# Patient Record
Sex: FEMALE | Race: WHITE | Employment: OTHER | URBAN - METROPOLITAN AREA
[De-identification: names, ages, dates, MRNs, and addresses within clinical notes are randomized per-mention and may not be internally consistent; named-entity substitution may affect disease eponyms.]

---

## 2018-08-06 ENCOUNTER — TRANSFERRED RECORDS (OUTPATIENT)
Dept: MULTI SPECIALTY CLINIC | Facility: CLINIC | Age: 43
End: 2018-08-06

## 2018-08-06 LAB — PAP SMEAR - HIM PATIENT REPORTED: NORMAL

## 2019-07-31 ENCOUNTER — OFFICE VISIT (OUTPATIENT)
Dept: INTERNAL MEDICINE | Facility: CLINIC | Age: 44
End: 2019-07-31
Payer: COMMERCIAL

## 2019-07-31 VITALS
SYSTOLIC BLOOD PRESSURE: 124 MMHG | OXYGEN SATURATION: 99 % | WEIGHT: 111.5 LBS | HEART RATE: 104 BPM | BODY MASS INDEX: 19.14 KG/M2 | DIASTOLIC BLOOD PRESSURE: 86 MMHG | RESPIRATION RATE: 18 BRPM

## 2019-07-31 DIAGNOSIS — Z12.31 ENCOUNTER FOR SCREENING MAMMOGRAM FOR BREAST CANCER: ICD-10-CM

## 2019-07-31 DIAGNOSIS — D21.22 FIBROMA OF LEFT FOOT: Primary | ICD-10-CM

## 2019-07-31 PROCEDURE — 99202 OFFICE O/P NEW SF 15 MIN: CPT | Performed by: INTERNAL MEDICINE

## 2019-07-31 NOTE — PROGRESS NOTES
SUBJECTIVE:                                                      HPI: Soraya Vann is a pleasant 44 year old female who presents with a bump on her foot:    Accompanied by SO.     First noticed bump on bottom of left foot several days ago. Mid foot. Painful to the touch.     No precipitating trauma or injury.   No fevers or chills.  No redness or skin changes.   No prior occurences or family history of similar bumps.     Patient has a very active job ([employer]) and is usually either barefoot or wearing flip flips when working.     Patient is also DUE for a screening mammogram.     The medication, allergy, and problem lists have been reviewed and updated as appropriate.     OBJECTIVE:                                                      /86   Pulse 104   Resp 18   Wt 50.6 kg (111 lb 8 oz)   LMP 07/30/2019 (Exact Date)   SpO2 99%   BMI 19.14 kg/m    Constitutional: well-appearing  Left foot, plantar aspect: subcutaneous nodule mid left foot, tender to palpation, no overlying skin changes    ASSESSMENT/PLAN:                                                      (D21.22) Fibroma of left foot  (primary encounter diagnosis)  Comment: plantar fibroma; patient reassured of benign nature.   Plan:    - cool compresses and NSAIDs for comfort.   - patient encouraged to wear supportive shoes when active.     (Z12.31) Encounter for screening mammogram for breast cancer  Plan: mammogram ordered - patient to schedule.     The instructions on the AVS were discussed and explained to the patient. Patient expressed understanding of instructions.    (Chart documentation was completed, in part, with [employer] voice-recognition software. Even though reviewed, some grammatical, spelling, and word errors may remain.)    Fela Welch MD   [address] [address]  T: [phone], F: [phone]

## 2019-07-31 NOTE — PATIENT INSTRUCTIONS
Fibroma - benign!    Inflamed now - causing pain.    Recommend ibuprofen and cool compresses as needed for pain relief and swelling.    ---    Wear supportive shoes as much as possible when active.

## 2019-08-06 ENCOUNTER — ANCILLARY PROCEDURE (OUTPATIENT)
Dept: MAMMOGRAPHY | Facility: CLINIC | Age: 44
End: 2019-08-06
Attending: INTERNAL MEDICINE
Payer: COMMERCIAL

## 2019-08-06 DIAGNOSIS — Z12.31 VISIT FOR SCREENING MAMMOGRAM: ICD-10-CM

## 2019-08-06 DIAGNOSIS — Z12.31 ENCOUNTER FOR SCREENING MAMMOGRAM FOR BREAST CANCER: ICD-10-CM

## 2019-08-06 PROCEDURE — 77063 BREAST TOMOSYNTHESIS BI: CPT | Mod: TC

## 2019-08-06 PROCEDURE — 77067 SCR MAMMO BI INCL CAD: CPT | Mod: TC

## 2019-12-06 ENCOUNTER — OFFICE VISIT (OUTPATIENT)
Dept: OBGYN | Facility: CLINIC | Age: 44
End: 2019-12-06
Payer: COMMERCIAL

## 2019-12-06 VITALS
WEIGHT: 118 LBS | SYSTOLIC BLOOD PRESSURE: 108 MMHG | HEART RATE: 76 BPM | HEIGHT: 64 IN | DIASTOLIC BLOOD PRESSURE: 76 MMHG | BODY MASS INDEX: 20.14 KG/M2

## 2019-12-06 DIAGNOSIS — Z80.3 FH: BREAST CANCER: ICD-10-CM

## 2019-12-06 DIAGNOSIS — R19.7 DIARRHEA, UNSPECIFIED TYPE: ICD-10-CM

## 2019-12-06 DIAGNOSIS — Z01.419 ENCOUNTER FOR GYNECOLOGICAL EXAMINATION WITHOUT ABNORMAL FINDING: Primary | ICD-10-CM

## 2019-12-06 PROCEDURE — 99386 PREV VISIT NEW AGE 40-64: CPT | Performed by: OBSTETRICS & GYNECOLOGY

## 2019-12-06 RX ORDER — SERTRALINE HYDROCHLORIDE 25 MG/1
TABLET, FILM COATED ORAL
COMMUNITY
Start: 2019-12-04 | End: 2023-03-20

## 2019-12-06 ASSESSMENT — ANXIETY QUESTIONNAIRES
2. NOT BEING ABLE TO STOP OR CONTROL WORRYING: NOT AT ALL
3. WORRYING TOO MUCH ABOUT DIFFERENT THINGS: NOT AT ALL
1. FEELING NERVOUS, ANXIOUS, OR ON EDGE: NOT AT ALL
6. BECOMING EASILY ANNOYED OR IRRITABLE: NOT AT ALL
7. FEELING AFRAID AS IF SOMETHING AWFUL MIGHT HAPPEN: NOT AT ALL
GAD7 TOTAL SCORE: 0
5. BEING SO RESTLESS THAT IT IS HARD TO SIT STILL: NOT AT ALL

## 2019-12-06 ASSESSMENT — PATIENT HEALTH QUESTIONNAIRE - PHQ9
SUM OF ALL RESPONSES TO PHQ QUESTIONS 1-9: 1
5. POOR APPETITE OR OVEREATING: NOT AT ALL

## 2019-12-06 ASSESSMENT — MIFFLIN-ST. JEOR: SCORE: 1175.24

## 2019-12-06 NOTE — NURSING NOTE
Please abstract the following data from this visit with this patient into the appropriate field in Epic:        Other Tests found in the patient's chart through Chart Review/Care Everywhere:    Pap smear done by this group [facility] this date: 8/6/18    Note to Abstraction: If this section is blank, no results were found via Chart Review/Care Everywhere.

## 2019-12-06 NOTE — Clinical Note
Please abstract the following data from this visit with this patient into the appropriate field in Epic:Other Tests found in the patient's chart through Chart Review/Care Everywhere:Pap smear done by this group [facility] this date: 8/6/18Note to Abstraction: If this section is blank, no results were found via Chart Review/Care Everywhere.

## 2019-12-06 NOTE — PROGRESS NOTES
Soraya is a 44 year old No obstetric history on file. female who presents for annual exam.     Besides routine health maintenance,  she would like to discuss bloating, gas, diarrhea.    HPI:  The patient does not have a PCP.    Moved [place] here from [place].  works for [employer].   Pt is .  Noting intermittent bloating and diarrhea. Hasn't noted association with foods.  No bleeding with BM.  Cycles regular. NO association of diarrhea with cycles.   Mild deep dyspareunia recently.  Strong FH of breast cancer in Mom and 3 Maternal Aunts. Brother with prostate cancer age 40. As far as pt knows, no one has had genetic testing. Brother declined it.        GYNECOLOGIC HISTORY:    Patient's last menstrual period was 2019.    Regular menses? yes  Menses every 30 days.  Length of menses: 7 days    Her current contraception method is: vasectomy.  She  reports that she has been smoking. She has been smoking about 1.00 pack per day. She has never used smokeless tobacco.  Tobacco Cessation Action Plan: Information offered: Patient not interested at this time  Self help information given to patient  Patient is sexually active.  STD testing offered?  Declined  Last PHQ-9 score on record =   PHQ-9 SCORE 2019   PHQ-9 Total Score 1     Last GAD7 score on record =   ROHAN-7 SCORE 2019   Total Score 0     Alcohol Score = 5    HEALTH MAINTENANCE:  Cholesterol: 1.5 years ago  Last Mammo: 19 Result: Normal, Next Mammo:   Pap: 18/16 NIL HPV-  Colonoscopy:  never, Result: Not applicable, Next Colonoscopy: age 51.  Dexa:  Long ago    Health maintenance updated:  yes    HISTORY:  OB History    Para Term  AB Living   1 1 1 0 0 1   SAB TAB Ectopic Multiple Live Births   0 0 0 0 1      # Outcome Date GA Lbr [place]/2nd Weight Sex Delivery Anes PTL Lv   1 Term                There is no problem list on file for this patient.    Past Surgical History:   Procedure Laterality Date      "APPENDECTOMY       COMBINED CYSTOSCOPY, INSERT CATHETER URETER  12/24/2013    Procedure: COMBINED CYSTOSCOPY, INSERT CATHETER URETER;;  Surgeon: Jaycob Rdz MD;  Location:  OR     EXTRACORPOREAL SHOCK WAVE LITHOTRIPSY (ESWL)  12/24/2013    Procedure: EXTRACORPOREAL SHOCK WAVE LITHOTRIPSY (ESWL);  CYSTOSCOPY, LEFT URETERAL catheter, LEFT EXTRACORPOREAL SHOCK WAVE LITHOTRIPSY ;  Surgeon: Jaycob Rdz MD;  Location:  OR     ORTHOPEDIC SURGERY      left wrist      Social History     Tobacco Use     Smoking status: Current Every Day Smoker     Packs/day: 1.00     Smokeless tobacco: Never Used   Substance Use Topics     Alcohol use: Yes     Alcohol/week: 7.0 standard drinks     Types: 7 Cans of beer per week     Comment: daily      Problem (# of Occurrences) Relation (Name,Age of Onset)    Breast Cancer (5) Mother (55), Paternal Grandmother, Maternal Aunt (65), Maternal Aunt (65), Maternal Aunt (65)    Heart Disease (1) Maternal Grandfather    Hypertension (3) Mother, Father, Brother    Prostate Cancer (1) Brother (40)            Current Outpatient Medications   Medication Sig     Lisdexamfetamine Dimesylate (VYVANSE PO) Take 70 mg by mouth daily      sertraline (ZOLOFT) 25 MG tablet      No current facility-administered medications for this visit.      Allergies   Allergen Reactions     Augmentin GI Disturbance     Penicillins GI Disturbance       Past medical, surgical, social and family histories were reviewed and updated in EPIC.    ROS:   12 point review of systems negative other than symptoms noted below or in the HPI.  Constitutional: Fatigue  Eyes: Vision Loss  Gastrointestinal: Bloating, Constipation, Diarrhea and Heartburn  Blood/Lymph: Easy Bleeding      EXAM:  /76   Pulse 76   Ht 1.626 m (5' 4\")   Wt 53.5 kg (118 lb)   LMP 11/20/2019   BMI 20.25 kg/m     BMI: Body mass index is 20.25 kg/m .    PHYSICAL EXAM:  Constitutional:   Appearance: Well nourished, well developed, alert, " in no acute distress  Neck:  Lymph Nodes:  No lymphadenopathy present    Thyroid:  Gland size normal, nontender, no nodules or masses present  on palpation  Chest:  Respiratory Effort:  Breathing unlabored  Cardiovascular:    Heart: Auscultation:  Regular rate, normal rhythm, no murmurs present  Breasts: Inspection of Breasts:  No lymphadenopathy present., Palpation of Breasts and Axillae:  No masses present on palpation, no breast tenderness., Axillary Lymph Nodes:  No lymphadenopathy present. and No nodularity, asymmetry or nipple discharge bilaterally.  Gastrointestinal:   Abdominal Examination:  Abdomen nontender to palpation, tone normal without rigidity or guarding, no masses present, umbilicus without lesions   Liver and Spleen:  No hepatomegaly present, liver nontender to palpation    Hernias:  No hernias present  Lymphatic: Lymph Nodes:  No other lymphadenopathy present  Skin:  General Inspection:  No rashes present, no lesions present, no areas of  discoloration  Neurologic:    Mental Status:  Oriented X3.  Normal strength and tone, sensory exam                grossly normal, mentation intact and speech normal.    Psychiatric:   Mentation appears normal and affect normal/bright.         Pelvic Exam:  External Genitalia:     Normal appearance for age, no discharge present, no tenderness present, no inflammatory lesions present, color normal  Vagina:     Normal vaginal vault without central or paravaginal defects, no discharge present, no inflammatory lesions present, no masses present  Bladder:     Nontender to palpation  Urethra:   Urethral Body:  Urethra palpation normal, urethra structural support normal   Urethral Meatus:  No erythema or lesions present  Cervix:     Appearance healthy, no lesions present, nontender to palpation, no bleeding present  Uterus:     Uterus: firm, normal sized and nontender, anteverted in position.   Adnexa:     No adnexal tenderness present, no adnexal masses  present  Perineum:     Perineum within normal limits, no evidence of trauma, no rashes or skin lesions present  Anus:     Anus within normal limits, no hemorrhoids present  Inguinal Lymph Nodes:     No lymphadenopathy present  Pubic Hair:     Normal pubic hair distribution for age  Genitalia and Groin:     No rashes present, no lesions present, no areas of discoloration, no masses present      COUNSELING:   Reviewed preventive health counseling, as reflected in patient instructions       Regular exercise    BMI: Body mass index is 20.25 kg/m .      ASSESSMENT:  44 year old female with satisfactory annual exam.    ICD-10-CM    1. Encounter for gynecological examination without abnormal finding Z01.419    2. FH: breast cancer Z80.3    3. Diarrhea, unspecified type R19.7        PLAN:  Discussed GI symptoms. Needs eval by FP or GI.  Normal pelvic exam but with FH and symptoms will do vaginal ultrasound to r/o ovarian pathology.   Gave card for genetic counseling. Will call mom to be sure no testing has been done.   Explained significance of positive genetic test.    Lonny Hart MD

## 2019-12-07 ENCOUNTER — HOSPITAL ENCOUNTER (EMERGENCY)
Facility: CLINIC | Age: 44
Discharge: HOME | End: 2019-12-07
Attending: NURSE PRACTITIONER | Admitting: NURSE PRACTITIONER
Payer: COMMERCIAL

## 2019-12-07 ENCOUNTER — APPOINTMENT (OUTPATIENT)
Dept: ULTRASOUND IMAGING | Facility: CLINIC | Age: 44
End: 2019-12-07
Attending: NURSE PRACTITIONER
Payer: COMMERCIAL

## 2019-12-07 VITALS
OXYGEN SATURATION: 100 % | TEMPERATURE: 97.8 F | HEART RATE: 89 BPM | RESPIRATION RATE: 16 BRPM | DIASTOLIC BLOOD PRESSURE: 99 MMHG | SYSTOLIC BLOOD PRESSURE: 155 MMHG

## 2019-12-07 DIAGNOSIS — R10.2 PELVIC PAIN IN FEMALE: ICD-10-CM

## 2019-12-07 DIAGNOSIS — D25.9 UTERINE FIBROID: ICD-10-CM

## 2019-12-07 LAB
ANION GAP SERPL CALCULATED.3IONS-SCNC: 4 MMOL/L (ref 3–14)
B-HCG FREE SERPL-ACNC: <5 IU/L
BUN SERPL-MCNC: 10 MG/DL (ref 7–30)
CALCIUM SERPL-MCNC: 9 MG/DL (ref 8.5–10.1)
CHLORIDE SERPL-SCNC: 105 MMOL/L (ref 94–109)
CO2 SERPL-SCNC: 28 MMOL/L (ref 20–32)
CREAT SERPL-MCNC: 0.77 MG/DL (ref 0.52–1.04)
ERYTHROCYTE [DISTWIDTH] IN BLOOD BY AUTOMATED COUNT: 12.7 % (ref 10–15)
GFR SERPL CREATININE-BSD FRML MDRD: >90 ML/MIN/{1.73_M2}
GLUCOSE SERPL-MCNC: 98 MG/DL (ref 70–99)
HCT VFR BLD AUTO: 46.8 % (ref 35–47)
HGB BLD-MCNC: 15.2 G/DL (ref 11.7–15.7)
MCH RBC QN AUTO: 31.5 PG (ref 26.5–33)
MCHC RBC AUTO-ENTMCNC: 32.5 G/DL (ref 31.5–36.5)
MCV RBC AUTO: 97 FL (ref 78–100)
PLATELET # BLD AUTO: 250 10E9/L (ref 150–450)
POTASSIUM SERPL-SCNC: 4.2 MMOL/L (ref 3.4–5.3)
RBC # BLD AUTO: 4.82 10E12/L (ref 3.8–5.2)
SODIUM SERPL-SCNC: 137 MMOL/L (ref 133–144)
WBC # BLD AUTO: 10.3 10E9/L (ref 4–11)

## 2019-12-07 PROCEDURE — 25000128 H RX IP 250 OP 636: Performed by: NURSE PRACTITIONER

## 2019-12-07 PROCEDURE — 84702 CHORIONIC GONADOTROPIN TEST: CPT

## 2019-12-07 PROCEDURE — 80048 BASIC METABOLIC PNL TOTAL CA: CPT | Performed by: NURSE PRACTITIONER

## 2019-12-07 PROCEDURE — 99284 EMERGENCY DEPT VISIT MOD MDM: CPT | Mod: 25

## 2019-12-07 PROCEDURE — 96374 THER/PROPH/DIAG INJ IV PUSH: CPT

## 2019-12-07 PROCEDURE — 76830 TRANSVAGINAL US NON-OB: CPT

## 2019-12-07 PROCEDURE — 85027 COMPLETE CBC AUTOMATED: CPT | Performed by: NURSE PRACTITIONER

## 2019-12-07 RX ORDER — HYDROCODONE BITARTRATE AND ACETAMINOPHEN 5; 325 MG/1; MG/1
1 TABLET ORAL EVERY 8 HOURS PRN
Qty: 10 TABLET | Refills: 0 | Status: SHIPPED | OUTPATIENT
Start: 2019-12-07 | End: 2020-05-27

## 2019-12-07 RX ORDER — KETOROLAC TROMETHAMINE 15 MG/ML
15 INJECTION, SOLUTION INTRAMUSCULAR; INTRAVENOUS ONCE
Status: COMPLETED | OUTPATIENT
Start: 2019-12-07 | End: 2019-12-07

## 2019-12-07 RX ADMIN — KETOROLAC TROMETHAMINE 15 MG: 15 INJECTION, SOLUTION INTRAMUSCULAR; INTRAVENOUS at 19:44

## 2019-12-07 ASSESSMENT — ANXIETY QUESTIONNAIRES: GAD7 TOTAL SCORE: 0

## 2019-12-07 ASSESSMENT — ENCOUNTER SYMPTOMS
FEVER: 0
VOMITING: 0
SHORTNESS OF BREATH: 0
FREQUENCY: 0
ABDOMINAL PAIN: 1
ABDOMINAL DISTENTION: 1
DIARRHEA: 0
CHILLS: 0
DYSURIA: 0
NAUSEA: 1

## 2019-12-07 NOTE — ED AVS SNAPSHOT
[facility] Emergency Department  [address]  [facility] [address]  Phone:  [phone]  Fax:  [phone]                                    Soraya Vann   MRN: [id number]    Department:  [facility] Emergency Department   Date of Visit:  12/7/2019           After Visit Summary Signature Page    I have received my discharge instructions, and my questions have been answered. I have discussed any challenges I see with this plan with the nurse or doctor.    ..........................................................................................................................................  Patient/Patient Representative Signature      ..........................................................................................................................................  Patient Representative Print Name and Relationship to Patient    ..................................................               ................................................  Date                                   Time    ..........................................................................................................................................  Reviewed by Signature/Title    ...................................................              ..............................................  Date                                               Time          22EPIC Rev 08/18

## 2019-12-08 NOTE — ED PROVIDER NOTES
History     Chief Complaint:  Abdominal Pain    HPI   Soraya Vann is a 44 year old female who presents to the emergency department today with abdominal pain. The patient reports some mild abdominal pain and bloating for the last  month. This got worse in the last 2 days and she saw her PCP yesterday. She states at one point the pain was so severe that she could not move, though this has subsided since then. This severe episode occurred after intercourse. The pain is mostly on the left side. She repots associated nausea, but denies vomiting and diarrhea. Patient does have a history of cysts and buttock abscess.     Allergies:  Augmentin  Penicillins     Medications:    Vyvanse  Zoloft       Past Medical History:    ADD  Headache  Kidney stone   PONV  Acne     Past Surgical History:    Appendectomy  Cystoscopy  Shock wave lithotripsy  Left wrist surgery      Family History:    Mother - Breat cancer   Hypertension  Father - Hypertension  Brother - Hypertension   Prostate cancer  Grandfather - heart disease   Breast cancer - grandmother and aunt     Social History:  The patient was accompanied to the ED by boyfriend.  Smoking Status: Current every day  Smokeless Tobacco: Never  Alcohol Use: Yes   Marital Status:      Review of Systems   Constitutional: Negative for chills and fever.   Respiratory: Negative for shortness of breath.    Cardiovascular: Negative for chest pain.   Gastrointestinal: Positive for abdominal distention, abdominal pain (left side) and nausea. Negative for diarrhea and vomiting.   Genitourinary: Positive for pelvic pain. Negative for dysuria, frequency, urgency, vaginal bleeding and vaginal discharge.   All other systems reviewed and are negative.      Physical Exam     Patient Vitals for the past 24 hrs:   BP Temp Temp src Pulse Heart Rate Resp SpO2   12/07/19 2135 (!) 155/99 -- -- 89 -- 16 100 %   12/07/19 1903 (!) 159/106 97.8  F (36.6  C) Oral 102 102 20 100 %       Physical Exam  General: Alert, Mild  discomfort, well kept  Eyes: PERRL, conjunctivae pink no scleral icterus or conjunctival injection  ENT:   Moist mucus membranes, posterior oropharynx clear without erythema or exudates, No lymphadenopathy, Normal voice  Resp:  Lungs clear to auscultation bilaterally, no crackles/rubs/wheezes. Good air movement  CV:  Normal rate and rhythm, no murmurs/rubs/gallops  GI:  Abdomen soft and non-distended.  Normoactive BS.  Mild suprapubic tenderness, No guarding or rebound, No masses  Skin:  Warm, dry.  No rashes or petechiae  Musculoskeletal: No peripheral edema or calf tenderness, Normal gross ROM   Neuro: Alert and oriented to person/place/time, normal sensation  Psychiatric: Normal affect, cooperative, good eye contact  Emergency Department Course   Imaging:  Radiology findings were communicated with the patient and family who voiced understanding of the findings.  US Pelvic Complete with Transvaginal   Final Result   IMPRESSION:   1.  2.3 cm left fundal fibroid.      2.  Small amount of free fluid in the pelvic cul-de-sac.               Report per radiology      Laboratory:  Laboratory findings were communicated with the patient and family who voiced understanding of the findings.  HCG: <5.0   CBC: AWNL (WBC 10.3, HGB 15.2, )   BMP: AWNL (Creatinine 0.77)     Interventions:  1944: Toradol 15 mg IV      Emergency Department Course:  Nursing notes and vitals reviewed.  1915: I performed an exam of the patient as documented above.   IV was inserted and blood was drawn for laboratory testing, results above.  The patient was sent for a US Pelvic Complete w Transvaginal while in the emergency department, results above.   Patient rechecked and updated.    2104: Findings and plan explained to the Patient. Patient discharged home with instructions regarding supportive care, medications, and reasons to return. The importance of close follow-up was reviewed. The patient was  prescribed Norco.    I personally reviewed the laboratory and imaging results with the Patient and answered all related questions prior to discharge.     Impression & Plan    Medical Decision Making:  Soraya Vann is a 44 year old female who presents today for evaluation of pelvic pain.  She has had ongoing intermittent cramping type of pelvic pain for the last month.  She saw her doctor yesterday had a full vaginal exam without acute findings.  A ultrasound was ordered for later this week.  After intercourse today she developed significant discomfort therefore presented for evaluation.  By the time I evaluated her she had significantly less discomfort.  Mild suprapubic tenderness.  I did order ultrasound which showed uterine fibroid as above.  No other source of discomfort noted.  She has noncontributory laboratory studies.  There is no sign of urinary infection.  She is not pregnant.  At this point the fibroid is most likely the cause of her discomfort.  There is no vaginal bleeding.  There is no indication for repeat pelvic exam today.  She does have plans to follow-up with her obstetrician this week.  We discussed appropriate use of NSAIDs.  She is given a small amount of Norco to help with worsening discomfort.  Advised on pelvic rest until follow-up with OB.  She will contact OB tomorrow with information from today's examination.  At this point time there is no indication for further testing or imagery.  She does appear to be safe and appropriate for outpatient management follow-up and is discharged home.    Diagnosis:    ICD-10-CM    1. Pelvic pain in female R10.2    2. Uterine fibroid D25.9        Disposition:  discharged to home    Discharge Medications:  Discharge Medication List as of 12/7/2019  9:22 PM      START taking these medications    Details   HYDROcodone-acetaminophen (NORCO) 5-325 MG tablet Take 1 tablet by mouth every 8 hours as needed for severe pain, Disp-10 tablet, R-0, Local  Print           Scribe Disclosure:  I, Fide Salinas MD, am serving as a scribe at 7:20 PM on 12/7/2019 to document services personally performed by Mikhail Pond APRN based on my observations and the provider's statements to me.    12/7/2019   [facility] EMERGENCY DEPARTMENT       Mikhail Pond APRN CNP  12/07/19 215

## 2019-12-08 NOTE — ED TRIAGE NOTES
Intermittent pelvic pain since Friday.  Had severe episode of pain 1 hr ago that occurred after intercourse which is resolving.  Nauseous, no vomiting or diarrhea.  Saw PCP Friday and has ultrasound ordered for next week.

## 2019-12-09 NOTE — PROGRESS NOTES
SUBJECTIVE:                                                   Soraya Vann is a 44 year old female who presents to clinic today for the following health issue(s):  Patient presents with:  Follow Up      HPI:  Just seen last week for annual exam.   Next day had intercourse and then severe pelvic pain. Nausea, sweating.  Pain did not improve over 2 hours so went to ER.   Ultrasound done that found small single intramural fibroid and moderate free fluid in pelvis.   Pt was in late luteal phase of cycle.   She is feeling good now and pain has resolved.       Patient's last menstrual period was 2019..     Patient is sexually active, .  Using none for contraception.    reports that she has been smoking. She has been smoking about 1.00 pack per day. She has never used smokeless tobacco.  Tobacco Cessation Action Plan: Information offered: Patient not interested at this time  STD testing offered?  Declined    Health maintenance updated: flu?  yes    Please abstract the following data from this visit with this patient into the appropriate field in Epic:    Other Tests found in the patient's chart through Chart Review/Care Everywhere:    Pap smear and HPV done by this group J[facility] this date: 16 wnl, HPV-        Problem list and histories reviewed & adjusted, as indicated.  Additional history: as documented.    There is no problem list on file for this patient.    Past Surgical History:   Procedure Laterality Date     APPENDECTOMY       COMBINED CYSTOSCOPY, INSERT CATHETER URETER  2013    Procedure: COMBINED CYSTOSCOPY, INSERT CATHETER URETER;;  Surgeon: Jaycob Rdz MD;  Location:  OR     EXTRACORPOREAL SHOCK WAVE LITHOTRIPSY (ESWL)  2013    Procedure: EXTRACORPOREAL SHOCK WAVE LITHOTRIPSY (ESWL);  CYSTOSCOPY, LEFT URETERAL catheter, LEFT EXTRACORPOREAL SHOCK WAVE LITHOTRIPSY ;  Surgeon: Jaycob Rdz MD;  Location:  OR     ORTHOPEDIC SURGERY      left  "wrist      Social History     Tobacco Use     Smoking status: Current Every Day Smoker     Packs/day: 1.00     Smokeless tobacco: Never Used   Substance Use Topics     Alcohol use: Yes     Alcohol/week: 7.0 standard drinks     Types: 7 Cans of beer per week     Comment: daily      Problem (# of Occurrences) Relation (Name,Age of Onset)    Breast Cancer (5) Mother (55), Paternal Grandmother, Maternal Aunt (65), Maternal Aunt (65), Maternal Aunt (65)    Heart Disease (1) Maternal Grandfather    Hypertension (3) Mother, Father, Brother    Prostate Cancer (1) Brother (40)            Current Outpatient Medications   Medication Sig     Lisdexamfetamine Dimesylate (VYVANSE PO) Take 70 mg by mouth daily      sertraline (ZOLOFT) 25 MG tablet      No current facility-administered medications for this visit.      Allergies   Allergen Reactions     Augmentin GI Disturbance     Penicillins GI Disturbance       ROS:  12 point review of systems negative other than symptoms noted below or in the HPI.        OBJECTIVE:     /80   Ht 1.626 m (5' 4\")   Wt 52.6 kg (116 lb)   LMP 11/20/2019   Breastfeeding No   BMI 19.91 kg/m    Body mass index is 19.91 kg/m .    Exam:  Constitutional:  Appearance: Well nourished, well developed alert, in no acute distress  Neurologic:  Mental Status:  Oriented X3.  Normal strength and tone, sensory exam grossly normal, mentation intact and speech normal.    Psychiatric:  Mentation appears normal and affect normal/bright.     In-Clinic Test Results:  No results found for this or any previous visit (from the past 24 hour(s)).    ASSESSMENT/PLAN:                                                        ICD-10-CM    1. Ruptured ovarian cyst N83.209    2. Intramural and submucous leiomyoma of uterus D25.1     D25.0        Reviewed ultrasound and her presentation. Hx consistent with ovarian cyst rupture after intercourse. Fibroid is an incidental finding.   Discussed chance of recurrence but not " likely since it happened so late in life. Most likely change in cycle in her 40's.   Reviewed insignificance of the single small fibroid.   Will observe for now.     15 minutes was spent face to face with the patient today discussing her history, diagnosis, and follow-up plan as noted above. Over 50% of the visit was spent in counseling and coordination of care.    Total Visit Time: 15 minutes.       Lonny Hart MD  [facility]

## 2019-12-13 ENCOUNTER — OFFICE VISIT (OUTPATIENT)
Dept: OBGYN | Facility: CLINIC | Age: 44
End: 2019-12-13
Payer: COMMERCIAL

## 2019-12-13 VITALS
SYSTOLIC BLOOD PRESSURE: 130 MMHG | DIASTOLIC BLOOD PRESSURE: 80 MMHG | HEIGHT: 64 IN | BODY MASS INDEX: 19.81 KG/M2 | WEIGHT: 116 LBS

## 2019-12-13 DIAGNOSIS — D25.0 INTRAMURAL AND SUBMUCOUS LEIOMYOMA OF UTERUS: ICD-10-CM

## 2019-12-13 DIAGNOSIS — D25.1 INTRAMURAL AND SUBMUCOUS LEIOMYOMA OF UTERUS: ICD-10-CM

## 2019-12-13 DIAGNOSIS — N83.209 RUPTURED OVARIAN CYST: Primary | ICD-10-CM

## 2019-12-13 PROCEDURE — 99213 OFFICE O/P EST LOW 20 MIN: CPT | Performed by: OBSTETRICS & GYNECOLOGY

## 2019-12-13 ASSESSMENT — MIFFLIN-ST. JEOR: SCORE: 1166.17

## 2020-03-02 ENCOUNTER — HEALTH MAINTENANCE LETTER (OUTPATIENT)
Age: 45
End: 2020-03-02

## 2020-03-17 ENCOUNTER — VIRTUAL VISIT (OUTPATIENT)
Dept: FAMILY MEDICINE | Facility: OTHER | Age: 45
End: 2020-03-17

## 2020-03-20 NOTE — PROGRESS NOTES
"Date: 2020 22:17:30  Clinician: Jenni Cole  Clinician NPI: [id number]  Patient: Soraya Akers  Patient : 1975  Patient Address: [address] [place] [id number]  Patient Phone: [phone]  Visit Protocol: URI  Patient Summary:  Soraya is a 45 year old ( : 1975 ) female who initiated a Visit for COVID-19 (Coronavirus) evaluation and screening. When asked the question \"Please sign me up to receive news, health information and promotions from [employer].\", Soraya responded \"No\".    Soraya states her symptoms started today.   Her symptoms consist of a cough, nasal congestion, tooth pain, chills, malaise, a headache, rhinitis, facial pain or pressure, and myalgia. She is experiencing difficulty breathing due to nasal congestion but she is not short of breath.   Symptom details     Nasal secretions: The color of her mucus is clear and white.    Cough: Soraya coughs a few times an hour and her cough is more bothersome at night. Phlegm comes into her throat when she coughs. She does not believe her cough is caused by post-nasal drip. The color of the phlegm is green, yellow, white, and clear.     Facial pain or pressure: The facial pain or pressure feels worse when bending over or leaning forward.     Headache: She states the headache is mild (1-3 on a 10 point pain scale).     Tooth pain: The tooth pain is not caused by a cavity, recent dental work, or other mouth problems.      Soraya denies having wheezing, sore throat, fever, and ear pain. She also denies taking antibiotic medication for the symptoms and having recent facial or sinus surgery in the past 60 days.   Precipitating events  She has not recently been exposed to someone with influenza. Soraya has not been in close contact with any high risk individuals.   Pertinent COVID-19 (Coronavirus) information  Soraya has traveled internationally or to the areas where COVID-19 (Coronavirus) is widespread in the " last 14 days before the start of her symptoms. Countries or locations traveled as reported by the patient (free text): Olga Lira has not had a close contact with a laboratory-confirmed COVID-19 patient within 14 days of symptom onset. She also has not had a close contact with a suspected COVID-19 patient within 14 days of symptom onset.   Soraya is not a healthcare worker and does not work in a healthcare facility.   Pertinent medical history  Soraya does not get yeast infections when she takes antibiotics.   Soraya does not need a return to work/school note.   Weight: 128 lbs   Soraya smokes or uses smokeless tobacco.   She denies pregnancy and denies breastfeeding. She has menstruated in the past month.   Weight: 128 lbs    MEDICATIONS: Vyvanse oral, ALLERGIES: amoxicillin  Clinician Response:  Dear Soraya,  I am sorry you are not feeling well. Your health is our priority. Based on the information you have provided, it is possible that you may have some type of viral infection.  Please read the full treatment plan and see my recommendations below.  Medication information  Because you have a viral infection, antibiotics will not help you get better. Treating a viral infection with antibiotics could actually make you feel worse.  Self care  The following tips will keep you as comfortable as possible while you recover:     Rest    Drink plenty of water and other liquids    Take a hot shower to loosen congestion    Take a spoonful of honey to reduce your cough     Also, as your provider, I need you to know that becoming tobacco-free is the most important thing you can do to protect your current and future health.  Additional treatment plan   Based on the information you have provided, it is recommended that you go to one of our designated Coronavirus (Covid-19) testing centers to get a test done from your car.  We are offering testing from your car in [facility],  [facility] and [place].   To schedule a visit at [facility] or [place], please call [phone] to find out when the next available testing window is. Testing will be done in 1 hour blocks so that you can wait at home until we are available to more quickly perform your testing from the car.   To complete testing in [place] ONLY, follow the instructions below:    Go as soon as possible during the hours noted to [facility] [address], [place] [address]. Hours: M-F 7:30am-5pm    What to expect:   When you arrive please come park in the parking lot.   Be prepared to present photo ID   Call [phone] and let them know you have arrived.    They will ask for information to get you registered for a visit and will ask for the description of your car and where you are parked.    They will add you to the queue to get your test (you will stay in your car the entire time).   You will then be met by a provider who will perform a brief assessment in your car and collect samples to test for coronavirus and possibly influenza or RSV.    Isolate yourself while traveling.  Do Not allow any visitors within 6 feet.  Do Not go to work or school.  Do Not go to [employer],  centers, shopping, or other public places.  Do Not shake hands.  Avoid close contact with others (hugging, kissing).Protect Others:     Cover Your Mouth and Nose with a mask, disposable tissue or wash cloth to avoid spreading germs to others.  Wash your hands and face frequently with soap and water   Fever Medicines:    For fever relief, take acetaminophen or ibuprofen.  Treat fevers above 101deg F (38.3deg C) to lower fevers and make you more comfortable.   Acetaminophen (e.g., Tylenol): Take 650 mg (two 325 mg pills) by mouth every 4-6 hours as needed of regular strength Tylenol or 1,000 mg (two 500 mg pills) every 8 hours as needed of  Extra Strength Tylenol.   Ibuprofen (e.g., Motrin, Advil): Take 400 mg (two 200 mg pills) by mouth every 6 hours as needed.   Acetaminophen is thought to be safer than ibuprofen or naproxen for people over 65 years old. Acetaminophen is in many OTC and prescription medicines. It might be in more than one medicine that you are taking. You need to be careful and not take an overdose. Before taking any medicine, read all the instructions on the package.  Caution -NSAIDs (e.g., ibuprofen, naproxen): Do not take nonsteroidal anti-inflammatory drugs (NSAIDs) if you have stomach problems, kidney disease, heart failure, or other contraindications to using this type of medicine. Do not take NSAID medicines for over 7 days without consulting your PCP. Do not take NSAID medicines if you are pregnant. Do not take NSAID medicines if you are also taking blood thinners.    Call or submit a new visit if: Breathing difficulty develops or you become worse.  Thank you for limiting contact with others, wearing a simple mask to cover your cough, practice good hand hygiene habits and accessing our virtual services where possible to limit the spread of this virus.  For more information about COVID19 and options for caring for yourself at home, please visit the CDC website at https://www.cdc.gov/coronavirus/2019-ncov/about/steps-when-sick.html  For more options for care at [facility], please visit our website at [email or web address]    COVID-19 (Coronavirus) General Information  With the increase in the number of COVID-19 (Coronavirus) cases, we understand you may have some questions. Below is some helpful information on COVID-19 (Coronavirus).  How can I protect myself and others from the COVID-19 (Coronavirus)?  Because there is currently no vaccine to prevent infection, the best way to protect yourself is to avoid being exposed to this virus. Put distance between yourself and other people if COVID-19  (Coronavirus) is spreading in your community. The virus is thought to spread mainly from person-to-person.     Between people who are in close contact with one another (within about 6 about) for prolonged period (10 minutes or longer).    Through respiratory droplets produced when an infected person coughs or sneezes.     The CDC recommends the following additional steps to protect yourself and others:     Wash your hands often with soap and water for at least 20 seconds, especially after blowing your nose, coughing, or sneezing; going to the bathroom; and before eating or preparing food.  Use an alcohol-based hand  that contains at least 60 percent alcohol if soap and water are not available.        Avoid touching your eyes, nose and mouth with unwashed hands.    Avoid close contact with people who are sick.    Stay home when you are sick.    Cover your cough or sneeze with a tissue, then throw the tissue in the trash.    Clean and disinfect frequently touched objects and surfaces.     You can help stop COVID-19 (Coronavirus) by knowing the signs and symptoms:     Fever    Cough    Shortness of breath     Contact your healthcare provider if   Develop symptoms   AND   Have been in close contact with a person known to have COVID-19 (Coronavirus) or live in or have recently traveled from an area with ongoing spread of COVID-19 (Coronavirus). Call ahead before you go to a doctor's office or emergency room. Tell them about your recent travel and your symptoms.   For the most up to date information, visit the CDC's website.  Steps to help prevent the spread of COVID-19 (Coronavirus) if you are sick  If you are sick with COVID-19 (Coronavirus) or suspect you are infected with the virus that causes COVID-19 (Coronavirus), follow the steps below to help prevent the disease from spreading&nbsp;to people in your home and community.     Stay home except to get medical care. Home isolation may be started in  "consultation with your healthcare clinician.    Separate yourself from other people and animals in your home.    Call ahead before visiting your doctor if you have a medical appointment.    Wear a facemask when you are around other people.    Cover your cough and sneezes.    Clean your hands often.    Avoid sharing personal household items.    Clean and disinfect frequently touched objects and surfaces everyday.    You will need to have someone drop off medications or household supplies (if needed) at your house without coming inside or in contact with you or others living in your house.    Monitor your symptoms and seek prompt medical care if your illness is worsening (e.g. Difficulty breathing).    Discontinue home isolation only in consultation with your healthcare provider.     For more detailed and up to date information on what to do if you are sick, visit this link: What to Do If You Are Sick With Coronavirus Disease 2019 (COVID-19).  Do I need to be tested for COVID-19 (Coronavirus)?     At this time, the limited number of tests available are controlled by the state and local health departments and are being reserved for more seriously ill patients, those with known exposure to confirmed patients, and those with recent travel (within 14 days) to countries with high rates of COVID-19 (Coronavirus).    Decisions on which patients receive testing will be based on the local spread of COVID-19 (Coronavirus) as well as the symptoms. Your healthcare provider will make the final decision on whether you should be tested.    In the meantime, if you have concerns that you may have been exposed, it is reasonable to practice \"social distancing.\"&nbsp; If you are ill with a cold or flu-like illness, please monitor your symptoms and reach out to your healthcare provider if your symptoms worsen.    For more up to date information, visit this link: COVID-19 (Coronavirus) Frequently Asked Questions and Answers.    "   Diagnosis: Cough  Diagnosis ICD: R05

## 2020-05-09 ENCOUNTER — HOSPITAL ENCOUNTER (EMERGENCY)
Facility: CLINIC | Age: 45
Discharge: HOME | End: 2020-05-09
Attending: EMERGENCY MEDICINE | Admitting: EMERGENCY MEDICINE
Payer: COMMERCIAL

## 2020-05-09 ENCOUNTER — APPOINTMENT (OUTPATIENT)
Dept: GENERAL RADIOLOGY | Facility: CLINIC | Age: 45
End: 2020-05-09
Attending: EMERGENCY MEDICINE
Payer: COMMERCIAL

## 2020-05-09 ENCOUNTER — APPOINTMENT (OUTPATIENT)
Dept: CT IMAGING | Facility: CLINIC | Age: 45
End: 2020-05-09
Attending: EMERGENCY MEDICINE
Payer: COMMERCIAL

## 2020-05-09 VITALS
OXYGEN SATURATION: 96 % | HEART RATE: 119 BPM | TEMPERATURE: 98.2 F | DIASTOLIC BLOOD PRESSURE: 97 MMHG | SYSTOLIC BLOOD PRESSURE: 156 MMHG | RESPIRATION RATE: 18 BRPM

## 2020-05-09 DIAGNOSIS — S20.222A CONTUSION OF LEFT BACK WALL OF THORAX, INITIAL ENCOUNTER: ICD-10-CM
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
## 2020-05-09 DIAGNOSIS — S50.02XA CONTUSION OF LEFT ELBOW, INITIAL ENCOUNTER: ICD-10-CM

## 2020-05-09 LAB
ANION GAP SERPL CALCULATED.3IONS-SCNC: 5 MMOL/L (ref 3–14)
BASOPHILS # BLD AUTO: 0 10E9/L (ref 0–0.2)
BASOPHILS NFR BLD AUTO: 0.6 %
BUN SERPL-MCNC: 10 MG/DL (ref 7–30)
CALCIUM SERPL-MCNC: 8.8 MG/DL (ref 8.5–10.1)
CHLORIDE SERPL-SCNC: 107 MMOL/L (ref 94–109)
CO2 SERPL-SCNC: 25 MMOL/L (ref 20–32)
CREAT SERPL-MCNC: 0.89 MG/DL (ref 0.52–1.04)
DIFFERENTIAL METHOD BLD: NORMAL
EOSINOPHIL # BLD AUTO: 0.2 10E9/L (ref 0–0.7)
EOSINOPHIL NFR BLD AUTO: 2.6 %
ERYTHROCYTE [DISTWIDTH] IN BLOOD BY AUTOMATED COUNT: 13 % (ref 10–15)
GFR SERPL CREATININE-BSD FRML MDRD: 79 ML/MIN/{1.73_M2}
GLUCOSE SERPL-MCNC: 81 MG/DL (ref 70–99)
HCT VFR BLD AUTO: 41 % (ref 35–47)
HGB BLD-MCNC: 13.2 G/DL (ref 11.7–15.7)
IMM GRANULOCYTES # BLD: 0 10E9/L (ref 0–0.4)
IMM GRANULOCYTES NFR BLD: 0.4 %
LYMPHOCYTES # BLD AUTO: 1.2 10E9/L (ref 0.8–5.3)
LYMPHOCYTES NFR BLD AUTO: 16.9 %
MCH RBC QN AUTO: 30.8 PG (ref 26.5–33)
MCHC RBC AUTO-ENTMCNC: 32.2 G/DL (ref 31.5–36.5)
MCV RBC AUTO: 96 FL (ref 78–100)
MONOCYTES # BLD AUTO: 0.4 10E9/L (ref 0–1.3)
MONOCYTES NFR BLD AUTO: 6.5 %
NEUTROPHILS # BLD AUTO: 5 10E9/L (ref 1.6–8.3)
NEUTROPHILS NFR BLD AUTO: 73 %
NRBC # BLD AUTO: 0 10*3/UL
NRBC BLD AUTO-RTO: 0 /100
PLATELET # BLD AUTO: 231 10E9/L (ref 150–450)
POTASSIUM SERPL-SCNC: 3.7 MMOL/L (ref 3.4–5.3)
RBC # BLD AUTO: 4.29 10E12/L (ref 3.8–5.2)
SODIUM SERPL-SCNC: 137 MMOL/L (ref 133–144)
WBC # BLD AUTO: 6.8 10E9/L (ref 4–11)

## 2020-05-09 PROCEDURE — 73080 X-RAY EXAM OF ELBOW: CPT | Mod: LT

## 2020-05-09 PROCEDURE — 25800030 ZZH RX IP 258 OP 636: Performed by: EMERGENCY MEDICINE

## 2020-05-09 PROCEDURE — 80048 BASIC METABOLIC PNL TOTAL CA: CPT | Performed by: EMERGENCY MEDICINE

## 2020-05-09 PROCEDURE — 25000128 H RX IP 250 OP 636: Performed by: EMERGENCY MEDICINE

## 2020-05-09 PROCEDURE — 85025 COMPLETE CBC W/AUTO DIFF WBC: CPT | Performed by: EMERGENCY MEDICINE

## 2020-05-09 PROCEDURE — 25000125 ZZHC RX 250: Performed by: EMERGENCY MEDICINE

## 2020-05-09 PROCEDURE — 99285 EMERGENCY DEPT VISIT HI MDM: CPT | Mod: 25

## 2020-05-09 PROCEDURE — 96374 THER/PROPH/DIAG INJ IV PUSH: CPT

## 2020-05-09 PROCEDURE — 96375 TX/PRO/DX INJ NEW DRUG ADDON: CPT

## 2020-05-09 PROCEDURE — 74177 CT ABD & PELVIS W/CONTRAST: CPT

## 2020-05-09 PROCEDURE — 96361 HYDRATE IV INFUSION ADD-ON: CPT

## 2020-05-09 RX ORDER — IOPAMIDOL 755 MG/ML
500 INJECTION, SOLUTION INTRAVASCULAR ONCE
Status: COMPLETED | OUTPATIENT
Start: 2020-05-09 | End: 2020-05-09

## 2020-05-09 RX ORDER — IBUPROFEN 600 MG/1
600 TABLET, FILM COATED ORAL ONCE
Status: DISCONTINUED | OUTPATIENT
Start: 2020-05-09 | End: 2020-05-09

## 2020-05-09 RX ORDER — OXYCODONE AND ACETAMINOPHEN 5; 325 MG/1; MG/1
1 TABLET ORAL ONCE
Status: DISCONTINUED | OUTPATIENT
Start: 2020-05-09 | End: 2020-05-09

## 2020-05-09 RX ORDER — IBUPROFEN 200 MG
600 TABLET ORAL EVERY 8 HOURS PRN
Qty: 30 TABLET | Refills: 0 | Status: SHIPPED | OUTPATIENT
Start: 2020-05-09 | End: 2020-05-27

## 2020-05-09 RX ORDER — KETOROLAC TROMETHAMINE 15 MG/ML
15 INJECTION, SOLUTION INTRAMUSCULAR; INTRAVENOUS ONCE
Status: COMPLETED | OUTPATIENT
Start: 2020-05-09 | End: 2020-05-09

## 2020-05-09 RX ORDER — HYDROMORPHONE HYDROCHLORIDE 1 MG/ML
0.5 INJECTION, SOLUTION INTRAMUSCULAR; INTRAVENOUS; SUBCUTANEOUS ONCE
Status: COMPLETED | OUTPATIENT
Start: 2020-05-09 | End: 2020-05-09

## 2020-05-09 RX ORDER — HYDROCODONE BITARTRATE AND ACETAMINOPHEN 5; 325 MG/1; MG/1
1 TABLET ORAL EVERY 6 HOURS PRN
Qty: 8 TABLET | Refills: 0 | Status: SHIPPED | OUTPATIENT
Start: 2020-05-09 | End: 2020-05-27

## 2020-05-09 RX ORDER — SODIUM CHLORIDE 9 MG/ML
1000 INJECTION, SOLUTION INTRAVENOUS CONTINUOUS
Status: DISCONTINUED | OUTPATIENT
Start: 2020-05-09 | End: 2020-05-09 | Stop reason: HOSPADM

## 2020-05-09 RX ADMIN — SODIUM CHLORIDE 70 ML: 9 INJECTION, SOLUTION INTRAVENOUS at 19:30

## 2020-05-09 RX ADMIN — HYDROMORPHONE HYDROCHLORIDE 0.5 MG: 1 INJECTION, SOLUTION INTRAMUSCULAR; INTRAVENOUS; SUBCUTANEOUS at 19:14

## 2020-05-09 RX ADMIN — IOPAMIDOL 80 ML: 755 INJECTION, SOLUTION INTRAVENOUS at 19:30

## 2020-05-09 RX ADMIN — KETOROLAC TROMETHAMINE 15 MG: 15 INJECTION, SOLUTION INTRAMUSCULAR; INTRAVENOUS at 19:13

## 2020-05-09 RX ADMIN — SODIUM CHLORIDE 1000 ML: 9 INJECTION, SOLUTION INTRAVENOUS at 19:14

## 2020-05-09 ASSESSMENT — ENCOUNTER SYMPTOMS
BACK PAIN: 1
ARTHRALGIAS: 1

## 2020-05-09 NOTE — ED AVS SNAPSHOT
[facility]  [address]  [facility] [address]  Phone:  [phone]  Fax:  [phone]                                    Soraya Vann   MRN: [id number]    Department:  [facility]   Date of Visit:  5/9/2020           After Visit Summary Signature Page    I have received my discharge instructions, and my questions have been answered. I have discussed any challenges I see with this plan with the nurse or doctor.    ..........................................................................................................................................  Patient/Patient Representative Signature      ..........................................................................................................................................  Patient Representative Print Name and Relationship to Patient    ..................................................               ................................................  Date                                   Time    ..........................................................................................................................................  Reviewed by Signature/Title    ...................................................              ..............................................  Date                                               Time          22EPIC Rev 08/18

## 2020-05-09 NOTE — ED PROVIDER NOTES
History     Chief Complaint:  Fall and Arm Injury      HPI   Soraya Vann is a 45 year old, left hand dominant, female who presents to the emergency department for evaluation of fall and subsequent arm injury. The patient reports just prior to arrival, about 20 minutes ago, she slipped at the top of a flight of wooden stairs at home and fell all the way down, landing on her left upper back and impacting her left elbow against the wood floor. She did not hit her head but reports she momentarily lost consciousness due to pain. She has not taken any pain medications.     Allergies:  Augmentin  Penicillins     Medications:    Vyvanse  Zoloft     Past Medical History:    Attention deficit disorder    Kidney stone    Past Surgical History:    Appendectomy   Cystoscopy and ureter catheter insertion combined   Extracorporeal shock wave lithotripsy   Left wrist surgery     Family History:    Mother - breast cancer, hypertension   Father - hypertension     Social History:  Smoking Status: Current Smoker  Smokeless Tobacco: Never Used  Alcohol Use: Yes  Drug Use: No  PCP: Lonny Hart  Marital Status:        Review of Systems   Musculoskeletal: Positive for arthralgias and back pain.   All other systems reviewed and are negative.      Physical Exam     Patient Vitals for the past 24 hrs:   BP Temp Temp src Pulse Resp SpO2   05/09/20 2030 (!) 156/97 -- -- -- -- 96 %   05/09/20 2000 (!) 120/100 -- -- -- -- 99 %   05/09/20 1915 (!) 142/101 -- -- -- -- 100 %   05/09/20 1900 -- -- -- -- -- 100 %   05/09/20 1843 (!) 142/90 98.2  F (36.8  C) Oral 119 18 98 %   05/09/20 1836 -- 98.2  F (36.8  C) Oral -- -- --       Physical Exam    GEN: alert, lying supine in obvious discomfort     HEAD: atraumatic    EYES: pupils reactive, extraocular muscles intact, conjunctivae normal    ENT: TMs normal as are EACs; nares patent; normal posterior pharynx and oral mucosa    NECK: no posterior midline tenderness, no  meningeal signs, trachea midline     RESPIRATORY: no tachypnea, breath sounds clear to auscultation    CVS: normal S1/S2, no murmurs/rubs/gallops    ABDOMEN: soft, nontender, no masses or organomegaly, no rebound, positive bowel sounds    MUSCULOSKELETAL: tender at the tip of the left scapula, no effusion at the left elbow but tender at the olecranon    SKIN: warm and dry, no acute rashes or ulceration, no erythema     NEURO: GCS 15, cranial nerves intact.  Motor and sensory- good tone; normal gait and coordination    LYMPH: no lymphadenopathy    PSYCHE: calm but in distress due to pain    Emergency Department Course     Imaging:  Radiology findings were communicated with the patient who voiced understanding of the findings.    CT Aortic Survey w Contrast  IMPRESSION:  1.  No evidence for aortic dissection or injury.  2.  No evidence for acute traumatic injury within the chest, abdomen or pelvis.  3.  Complex cyst left ovary may represent a ruptured ovarian cyst with small amount of fluid/hemorrhage in the adjacent pelvis.    Reading per radiology.      Elbow XR, G/E 3 views, left  IMPRESSION: Normal joint spaces and alignment. No fracture or joint effusion. Subtle soft tissue prominence/swelling posteriorly.    Reading per radiology.      Laboratory:  Laboratory findings were communicated with the patient who voiced understanding of the findings.    BMP: Glucose 81, o/w WNL (Creatinine: 0.89)    CBC: WBC: 6.8, HGB: 13.2, PLT: 231     Interventions:  1913 Toradol 15 mg IV  1914 Dilaudid 0.5 mg IV  1914 NS 1L IV     Emergency Department Course:  Past medical records, nursing notes, and vitals reviewed.    1840 I performed an exam of the patient as documented above.      IV was inserted and blood was drawn for laboratory testing, results above.     The patient was sent for a CT aortic survey and left elbow x-ray while in the emergency department, results above.     2038 I rechecked the patient and discussed the  results of her workup thus far.     Findings and plan explained to the Patient. Patient discharged home with instructions regarding supportive care, medications, and reasons to return. The importance of close follow-up was reviewed. The patient was prescribed Norco and Advil.    I personally reviewed the laboratory and imaging results with the Patient and answered all related questions prior to discharge.     Impression & Plan     Medical Decision Making:  Senait Mitchell is a 45-year-old female who went down several steps and landed on her left back area and struck her elbow on hard wooden edge of the step.  She is complaining of pain in both those areas only.  She did not hit her head.  Because of the severity of her pain and how uncomfortable she was a CT of the chest was done including the aorta study which was negative for any traumatic injury.  Interestingly as well her elbow appears normal from standpoint of the bony architecture.  In addition clinically on exam I am really not seeing a lot of swelling nor any color change to the skin.  I did give the patient IV Toradol as well as IV Dilaudid with some improvement.  We also gave her ice packs in the ER will have her continue to ice over the next 24 to 48 hours and use ibuprofen as directed.  If she needs it she can add the Norco that I gave her, 1 tablet every 6 hours as needed. I gave her a total of #6 as I do not think contusions warrant further narcotic therapy.  If she still having pain after 48 hours and not significantly starting to improve she should follow-up with her PCP at that time.    Diagnosis:    ICD-10-CM    1. Fall down stairs, initial encounter  W10.8XXA    2. Contusion of left back wall of thorax, initial encounter  S20.222A    3. Contusion of left elbow, initial encounter  S50.02XA        Disposition:  Discharged to home.    Discharge Medications:  New Prescriptions    HYDROCODONE-ACETAMINOPHEN (NORCO) 5-325 MG TABLET    Take 1 tablet by  mouth every 6 hours as needed for severe pain    IBUPROFEN (ADVIL/MOTRIN) 200 MG TABLET    Take 3 tablets (600 mg) by mouth every 8 hours as needed for mild pain       Scribe Disclosure:  I, Breonna Doshi, am serving as a scribe at 6:46 PM on 5/9/2020 to document services personally performed by Rylan Stallings MD based on my observations and the provider's statements to me.        Rylan Stallings MD  05/11/20 0024

## 2020-05-12 ENCOUNTER — APPOINTMENT (OUTPATIENT)
Dept: GENERAL RADIOLOGY | Facility: CLINIC | Age: 45
End: 2020-05-12
Attending: EMERGENCY MEDICINE
Payer: COMMERCIAL

## 2020-05-12 ENCOUNTER — HOSPITAL ENCOUNTER (EMERGENCY)
Facility: CLINIC | Age: 45
Discharge: HOME | End: 2020-05-12
Attending: EMERGENCY MEDICINE | Admitting: EMERGENCY MEDICINE
Payer: COMMERCIAL

## 2020-05-12 VITALS
TEMPERATURE: 99.6 F | DIASTOLIC BLOOD PRESSURE: 94 MMHG | OXYGEN SATURATION: 100 % | SYSTOLIC BLOOD PRESSURE: 136 MMHG | RESPIRATION RATE: 16 BRPM | HEART RATE: 112 BPM

## 2020-05-12 DIAGNOSIS — S22.32XA CLOSED FRACTURE OF ONE RIB OF LEFT SIDE, INITIAL ENCOUNTER: ICD-10-CM

## 2020-05-12 PROCEDURE — 71101 X-RAY EXAM UNILAT RIBS/CHEST: CPT | Mod: LT

## 2020-05-12 PROCEDURE — 25000132 ZZH RX MED GY IP 250 OP 250 PS 637: Performed by: EMERGENCY MEDICINE

## 2020-05-12 PROCEDURE — 72070 X-RAY EXAM THORAC SPINE 2VWS: CPT

## 2020-05-12 PROCEDURE — 99285 EMERGENCY DEPT VISIT HI MDM: CPT

## 2020-05-12 PROCEDURE — 72100 X-RAY EXAM L-S SPINE 2/3 VWS: CPT

## 2020-05-12 RX ORDER — CYCLOBENZAPRINE HCL 10 MG
10 TABLET ORAL ONCE
Status: COMPLETED | OUTPATIENT
Start: 2020-05-12 | End: 2020-05-12

## 2020-05-12 RX ORDER — LIDOCAINE 4 G/G
1 PATCH TOPICAL EVERY 24 HOURS
Qty: 15 PATCH | Refills: 0 | Status: SHIPPED | OUTPATIENT
Start: 2020-05-12 | End: 2023-03-20

## 2020-05-12 RX ORDER — OXYCODONE HYDROCHLORIDE 5 MG/1
5 TABLET ORAL ONCE
Status: COMPLETED | OUTPATIENT
Start: 2020-05-12 | End: 2020-05-12

## 2020-05-12 RX ORDER — OXYCODONE HYDROCHLORIDE 5 MG/1
5 TABLET ORAL EVERY 6 HOURS PRN
Qty: 12 TABLET | Refills: 0 | Status: SHIPPED | OUTPATIENT
Start: 2020-05-12 | End: 2020-05-28

## 2020-05-12 RX ORDER — CYCLOBENZAPRINE HCL 10 MG
5-10 TABLET ORAL 3 TIMES DAILY PRN
Qty: 20 TABLET | Refills: 0 | Status: SHIPPED | OUTPATIENT
Start: 2020-05-12 | End: 2020-05-27

## 2020-05-12 RX ORDER — LIDOCAINE 4 G/G
1 PATCH TOPICAL ONCE
Status: DISCONTINUED | OUTPATIENT
Start: 2020-05-12 | End: 2020-05-13 | Stop reason: HOSPADM

## 2020-05-12 RX ADMIN — LIDOCAINE 1 PATCH: 560 PATCH PERCUTANEOUS; TOPICAL; TRANSDERMAL at 19:40

## 2020-05-12 RX ADMIN — CYCLOBENZAPRINE HYDROCHLORIDE 10 MG: 10 TABLET, FILM COATED ORAL at 19:40

## 2020-05-12 RX ADMIN — OXYCODONE HYDROCHLORIDE 5 MG: 5 TABLET ORAL at 22:34

## 2020-05-12 ASSESSMENT — ENCOUNTER SYMPTOMS
BACK PAIN: 1
WEAKNESS: 0
NUMBNESS: 0

## 2020-05-12 NOTE — ED AVS SNAPSHOT
[facility] Emergency Department  [address]  [facility] [address]  Phone:  [phone]  Fax:  [phone]                                    Soraya Vann   MRN: [id number]    Department:  [facility] Emergency Department   Date of Visit:  5/12/2020           After Visit Summary Signature Page    I have received my discharge instructions, and my questions have been answered. I have discussed any challenges I see with this plan with the nurse or doctor.    ..........................................................................................................................................  Patient/Patient Representative Signature      ..........................................................................................................................................  Patient Representative Print Name and Relationship to Patient    ..................................................               ................................................  Date                                   Time    ..........................................................................................................................................  Reviewed by Signature/Title    ...................................................              ..............................................  Date                                               Time          22EPIC Rev 08/18

## 2020-05-12 NOTE — ED TRIAGE NOTES
Arrives with left side back and rib cage pain after a fall Friday, seen in this ED but unable to achieve pain control at home, denies other complaints, ABCs intact.

## 2020-05-13 NOTE — DISCHARGE INSTRUCTIONS

## 2020-05-13 NOTE — ED PROVIDER NOTES
"  History     Chief Complaint:  Back Pain       The history is provided by the patient.      Soraya Vann is a 45 year old female who presents after slipped and falling down the stairs. The patient states that hit her upper back and elbow on the stairs, and was then seen here three days ago. She notes no loss of consciousness at that time. She had a aortic CT performed at that time along with an elbow x-ray which were negative. She was then diagnosed with contusions and sent home with Norco and ibuprofen. She states that since then, she has had significant discomfort that is not controlled with Tylenol at home. She notes exacerbating pain with coughing, deep breaths and moving. She denies any weakness, numbness or tingling in her legs or arms. She also notes of some muscle spasms of her back as well as some \"popping\" of her ribs. She presents today requesting additional pain medications, concerned that the [place] and ibuprofen not been helpful. She states that she last treated with Norco yesterday. She wonders if she may have a medication for the muscle spasms, noting great sleep difficulty due to these.       Imaging Obtained Three Days Ago:    CT Aortic Survey w Contrast:  1.  No evidence for aortic dissection or injury.  2.  No evidence for acute traumatic injury within the chest, abdomen or pelvis.  3.  Complex cyst left ovary may represent a ruptured ovarian cyst with small amount of fluid/hemorrhage in the adjacent pelvis.     Elbow XR, G/E 3 views, left:  Normal joint spaces and alignment. No fracture or joint effusion. Subtle soft tissue prominence/swelling posteriorly.    Allergies:  Augmentin  Penicillins     Medications:    Vyvanse  Zoloft    Past Medical History:    ADD  Kidney stone    Past Surgical History:    Appendectomy  Combined cystoscopy, insert catheter ureter  Extracorporeal shock wave lithotripsy  Left wrist surgery    Family History:    Breast cancer  Hypertension  Prostate " cancer    Social History:  The patient presents to the ED alone.  Smoking Status: Current Every Day Smoker, 1PPD  Smokeless Tobacco: Never Used  Alcohol Use: Yes, daily  Drug Use: No  PCP: Lonny Hart     Review of Systems   Musculoskeletal: Positive for back pain.        (+) Rib pain, elbow pain  (+) Back muscle spasms   Neurological: Negative for weakness and numbness.   All other systems reviewed and are negative.      Physical Exam     Patient Vitals for the past 24 hrs:   BP Temp Temp src Pulse Resp SpO2   05/12/20 1726 (!) 136/94 99.6  F (37.6  C) Temporal 112 16 100 %       Physical Exam  Skin:              General: Alert, tearful; nontoxic appearing  HEENT:  Moist mucous membranes. Conjunctiva normal.  CV:  RRR, no m/r/g, skin warm and well perfused  Pulm:  CTAB, no wheezes/ronchi/rales.  No acute distress, breathing comfortably; palpable click and pain with A/P compression of left upper posterior chest; no chest wall crepitus  GI:  Soft, nontender, nondistended.  No rebound or guarding.  Normal bowel sounds  MSK:  Moving all extremities.  No focal areas of edema, erythema, or tenderness; no scapular tenderness; no midline cervical tenderness; mild thoracic/lumbar midline tenderness  Skin:  WWP, no rashes, no lower extremity edema, skin color normal, no diaphoresis    Emergency Department Course     Imaging:  Radiology findings were communicated with the patient who voiced understanding of the findings.    XR Thoracic Spine 2 Views:  No fracture is identified. Upper thoracic vertebral bodies are poorly visualized on the lateral view. Alignment is significant for slight levoconvex curvature. Paraspinal soft tissues are unremarkable.  As per radiology.     Ribs XR, unilat 3 views + PA chest,  Left:  The visualized heart and lungs are negative. Nondisplaced fracture involving the posterior left eighth rib.  Addendum 1 of 1  Addendum: There is also a fracture involving the lateral left third rib.  As  per radiology.     Lumbar spine XR, 2-3 views:  No fracture is identified. Vertebral body heights are maintained. Lateral view of the lumbar spine is partially obscured by bowel gas. Alignment is significant for slight dextroconvex curvature.  As per radiology.     Interventions:  1940 Flexeril 10mg PO  1940 Lidocaine 4% Patch 1 Patch Transdermal  2234 oxycodone 5mg PO    Emergency Department Course:  Past medical records, nursing notes, and vitals reviewed.    1911 I performed an exam of the patient as documented above.     The patient was sent for multiple x-ray while in the emergency department, results above.     2130 I spoke with Dr. Key, radiology, regarding the patient's x-ray results.    2209 I rechecked the patient and discussed the results of her workup thus far. At this point I feel that the patient is safe for discharge, and the patient agrees.    Findings and plan explained to the patient. Patient discharged home with instructions regarding supportive care, medications, and reasons to return. The importance of close follow-up was reviewed. The patient was prescribed Flexeril, lidocaine patches and oxycodone.    I personally reviewed the imaging results with the patient and answered all related questions prior to discharge.     Impression & Plan     Medical Decision Making:  Soraya Vann is a 45 year old female who presents to the ER for evaluation of continued left chest wall pain after fall 5/9.  Please see HPI for specifics.  She has palpable click and tenderness to the superior/posterior aspect of the chest wall.  Otherwise no crepitus.  Concern for rib fractures.  Vital signs are stable.  Rib fracture is noted as above without any evidence of complication such as pneumothorax.  No signs of flail chest.  Pain somewhat controlled in the ER.  The rest of her head to toe exam was unremarkable for injury or pain.  No indication for further imaging.  Discussed expectant management of rib  fractures.  She is safe to discharge home with the medicines below.  Incentive spirometry also sent home with patient with instructions on its use discussed in the ER.  Follow-up with PCP this week.  Reasons to return to ER were discussed.  All questions answered.    Diagnosis:    ICD-10-CM    1. Closed fracture of one rib of left side, initial encounter  S22.32XA        Disposition:  Discharged to home.    Discharge Medications:  Discharge Medication List as of 5/12/2020 10:00 PM      START taking these medications    Details   cyclobenzaprine (FLEXERIL) 10 MG tablet Take 0.5-1 tablets (5-10 mg) by mouth 3 times daily as needed for muscle spasms, Disp-20 tablet,R-0, Local Print      Lidocaine (LIDOCARE) 4 % Patch Place 1 patch onto the skin every 24 hours To prevent lidocaine toxicity, patient should be patch free for 12 hrs daily.Disp-15 patch,R-0Local Print      oxyCODONE (ROXICODONE) 5 MG tablet Take 1 tablet (5 mg) by mouth every 6 hours as needed for breakthrough pain or moderate to severe pain, Disp-12 tablet,R-0, Local Print             Scribe Disclosure:  I, Alok Panda, am serving as a scribe at 7:11 PM on 5/12/2020 to document services personally performed by Ramana Howell MD based on my observations and the provider's statements to me.      Ramana Howell MD  05/13/20 0126

## 2020-05-19 ENCOUNTER — TELEPHONE (OUTPATIENT)
Dept: OBGYN | Facility: CLINIC | Age: 45
End: 2020-05-19

## 2020-05-19 NOTE — TELEPHONE ENCOUNTER
Pt seen in ER 5/12/20 for rib fractures and sent home w instructions to take ibuprofen prn and f/u with PCP.  Pt now calling to see Dr Hart - out of the office this week; needs to be seen/evaluated for analgesia stronger than ibuprofen    Recommended pt call the  [facility] who she has seen before. They may be able to offer a tele-med visit. They can see her records in Epic.  Number given.    Pt appreciative of the direction and will call IM/FP next for f/u.  Katherine Rg RN on 5/19/2020 at 4:18 PM

## 2020-05-20 ENCOUNTER — VIRTUAL VISIT (OUTPATIENT)
Dept: INTERNAL MEDICINE | Facility: CLINIC | Age: 45
End: 2020-05-20
Payer: COMMERCIAL

## 2020-05-20 DIAGNOSIS — N83.292 COMPLEX CYST OF LEFT OVARY: ICD-10-CM

## 2020-05-20 DIAGNOSIS — Z09 HOSPITAL DISCHARGE FOLLOW-UP: Primary | ICD-10-CM

## 2020-05-20 DIAGNOSIS — S22.32XS CLOSED FRACTURE OF ONE RIB OF LEFT SIDE, SEQUELA: ICD-10-CM

## 2020-05-20 PROCEDURE — 99214 OFFICE O/P EST MOD 30 MIN: CPT | Mod: 95 | Performed by: INTERNAL MEDICINE

## 2020-05-20 RX ORDER — IBUPROFEN 600 MG/1
600 TABLET, FILM COATED ORAL EVERY 8 HOURS PRN
Qty: 30 TABLET | Refills: 0 | Status: SHIPPED | OUTPATIENT
Start: 2020-05-20 | End: 2023-03-20

## 2020-05-20 RX ORDER — OXYCODONE HYDROCHLORIDE 5 MG/1
5 TABLET ORAL EVERY 8 HOURS PRN
Qty: 9 TABLET | Refills: 0 | Status: SHIPPED | OUTPATIENT
Start: 2020-05-20 | End: 2020-05-28

## 2020-05-20 RX ORDER — CYCLOBENZAPRINE HCL 10 MG
10 TABLET ORAL 3 TIMES DAILY PRN
Qty: 30 TABLET | Refills: 0 | Status: SHIPPED | OUTPATIENT
Start: 2020-05-20 | End: 2023-03-20

## 2020-05-20 ASSESSMENT — ENCOUNTER SYMPTOMS
SHORTNESS OF BREATH: 0
FEVER: 0
ARTHRALGIAS: 1

## 2020-05-20 NOTE — PROGRESS NOTES
"Soraya Vann is a 45 year old female who is being evaluated via a billable video visit.      The patient has been notified of following:     \"This video visit will be conducted via a call between you and your physician/provider. We have found that certain health care needs can be provided without the need for an in-person physical exam.  This service lets us provide the care you need with a video conversation.  If a prescription is necessary we can send it directly to your pharmacy.  If lab work is needed we can place an order for that and you can then stop by our lab to have the test done at a later time.    Video visits are billed at different rates depending on your insurance coverage.  Please reach out to your insurance provider with any questions.    If during the course of the call the physician/provider feels a video visit is not appropriate, you will not be charged for this service.\"    Patient has given verbal consent for Video visit? Yes    How would you like to obtain your AVS? Mail a copy    Patient would like the video invitation sent by: Text to cell phone: [phone]    Will anyone else be joining your video visit? No    Subjective     Soraya Vann is a 45 year old female who presents today via video visit for the following health issues:    HPI  ED/UC Followup:    Facility:  [facility] ED  Date of visit: 05/12/2020  Reason for visit: Back Pain, Fall  Current Status: stable       Video Start Time: 11:28 AM    Patient had a fall at home and then landed on her left upper back and wrist.  Patient went to ER on the same day, 5/9/2020 and had imaging which was negative for any fracture.  Patient also had CT which showed 2.2 cm left ovarian cyst.  Her pain did not get any better and went to ER again on 5/12/2020 and had an x-ray of the ribs that showed nondisplaced fracture of left 8 rib.  Patient took oxycodone more frequently in the first few days and started taking " only at night as it is difficult to sleep with the pain.  Patient also tried 200 mg ibuprofen that did not help with the pain.  Flexeril also help with the pain and discomfort.  Patient is continuing to use incentive spirometry.    Patient Active Problem List   Diagnosis     Complex cyst of left ovary     Closed fracture of one rib of left side, sequela     Past Surgical History:   Procedure Laterality Date     APPENDECTOMY       COMBINED CYSTOSCOPY, INSERT CATHETER URETER  12/24/2013    Procedure: COMBINED CYSTOSCOPY, INSERT CATHETER URETER;;  Surgeon: Jaycob Rdz MD;  Location:  OR     EXTRACORPOREAL SHOCK WAVE LITHOTRIPSY (ESWL)  12/24/2013    Procedure: EXTRACORPOREAL SHOCK WAVE LITHOTRIPSY (ESWL);  CYSTOSCOPY, LEFT URETERAL catheter, LEFT EXTRACORPOREAL SHOCK WAVE LITHOTRIPSY ;  Surgeon: Jaycob Rdz MD;  Location:  OR     ORTHOPEDIC SURGERY      left wrist       Social History     Tobacco Use     Smoking status: Current Every Day Smoker     Packs/day: 1.00     Smokeless tobacco: Never Used   Substance Use Topics     Alcohol use: Yes     Alcohol/week: 7.0 standard drinks     Types: 7 Cans of beer per week     Comment: daily     Family History   Problem Relation Age of Onset     Breast Cancer Mother 55     Hypertension Mother      Hypertension Father      Hypertension Brother      Prostate Cancer Brother 40     Heart Disease Maternal Grandfather      Breast Cancer Paternal Grandmother      Breast Cancer Maternal Aunt 65     Breast Cancer Maternal Aunt 65     Breast Cancer Maternal Aunt 65         Current Outpatient Medications   Medication Sig Dispense Refill     cyclobenzaprine (FLEXERIL) 10 MG tablet Take 1 tablet (10 mg) by mouth 3 times daily as needed for muscle spasms 30 tablet 0     cyclobenzaprine (FLEXERIL) 10 MG tablet Take 0.5-1 tablets (5-10 mg) by mouth 3 times daily as needed for muscle spasms 20 tablet 0     ibuprofen (ADVIL/MOTRIN) 200 MG tablet Take 3 tablets (600 mg) by  "mouth every 8 hours as needed for mild pain 30 tablet 0     ibuprofen (ADVIL/MOTRIN) 600 MG tablet Take 1 tablet (600 mg) by mouth every 8 hours as needed for moderate pain 30 tablet 0     Lidocaine (LIDOCARE) 4 % Patch Place 1 patch onto the skin every 24 hours To prevent lidocaine toxicity, patient should be patch free for 12 hrs daily. 15 patch 0     Lisdexamfetamine Dimesylate (VYVANSE PO) Take 70 mg by mouth daily        oxyCODONE (ROXICODONE) 5 MG tablet Take 1 tablet (5 mg) by mouth every 8 hours as needed for severe pain 9 tablet 0     oxyCODONE (ROXICODONE) 5 MG tablet Take 1 tablet (5 mg) by mouth every 6 hours as needed for breakthrough pain or moderate to severe pain 12 tablet 0     sertraline (ZOLOFT) 25 MG tablet        Allergies   Allergen Reactions     Augmentin GI Disturbance     Penicillins GI Disturbance       Reviewed and updated as needed this visit by Provider       Review of Systems   Constitutional: Negative for fever.   Respiratory: Negative for shortness of breath.    Musculoskeletal: Positive for arthralgias.          Objective    There were no vitals taken for this visit.  Estimated body mass index is 19.91 kg/m  as calculated from the following:    Height as of 12/13/19: 1.626 m (5' 4\").    Weight as of 12/13/19: 52.6 kg (116 lb).  Physical Exam     \"GENERAL: Healthy, alert and no distress\",\"EYES: Eyes grossly normal to inspection.  No discharge or erythema, or obvious scleral/conjunctival abnormalities.\",\"RESP: No audible wheeze, cough, or visible cyanosis.  No visible retractions or increased work of breathing.  \",\"SKIN: Visible skin clear. No significant rash, abnormal pigmentation or lesions.\",\"NEURO: Cranial nerves grossly intact.  Mentation and speech appropriate for age.\",\"PSYCH: Mentation appears normal, affect normal/bright, judgement and insight intact, normal speech and appearance well-groomed.\"      Assessment & Plan     Soraya was seen today for er f/u.    Diagnoses and " all orders for this visit:    Hospital discharge follow-up    Closed fracture of one rib of left side, sequela  -     cyclobenzaprine (FLEXERIL) 10 MG tablet; Take 1 tablet (10 mg) by mouth 3 times daily as needed for muscle spasms  -     oxyCODONE (ROXICODONE) 5 MG tablet; Take 1 tablet (5 mg) by mouth every 8 hours as needed for severe pain  -     ibuprofen (ADVIL/MOTRIN) 600 MG tablet; Take 1 tablet (600 mg) by mouth every 8 hours as needed for moderate pain    Complex cyst of left ovary    Will refill muscle relaxer and gave a few pills of hydrocodone and 16 mg ibuprofen to use it to help with the pain.  Advised to continue incentive spirometry.  Regarding the complexes to the left ovary advised the patient to follow-up with her gynecologist.    Jayce Mulligan MD  [facility]      Video-Visit Details    Type of service:  Video Visit    Video End Time:11:39 AM    Originating Location (pt. Location): Home    Distant Location (provider location):  [facility]     Platform used for Video Visit: Yancy Mulligan MD

## 2020-05-20 NOTE — PATIENT INSTRUCTIONS
Patient Education     Rib Fracture    You broke one or more ribs. This is called a rib fracture. Rib fractures don't need a cast like other bones. They will heal by themselves in about 4 to 6 weeks. The first 3 to 4 weeks will be the most painful. During this time deep breathing, coughing, or changing position from sitting to lying down, may cause the broken ends to move slightly.  Home care    Rest. You should not be doing any heavy lifting or strenuous exertion until the pain goes away.    It hurts to breathe when you have a broken rib. This puts you at risk of getting pneumonia from poor airflow through your lungs. To prevent this:  ? Take several very deep breaths once an hour while you're awake. Breathe out through pursed lips as if you are blowing up a balloon. If possible, actually blow up a balloon or a rubber glove. This exercise builds up pressure inside the lung and prevents collapse of the small air sacs of the lung. This exercise may cause some pain at the site of injury. This is normal.  ? You may have gotten a breathing exercise device called an incentive spirometer. Use it at least 4 times a day, or as directed.    Apply an ice pack over the injured area for 15 to 20 minutes every 1 to 2 hours. You should do this for the first 24 to 48 hours. To make an ice pack, put ice cubes in a plastic bag that seals at the top. Wrap the bag in a clean, thin towel or cloth. Never put ice or an ice pack directly on the skin. Keep using ice packs as needed for the relief of pain and swelling.    You may use over-the-counter pain medicine to control pain, unless another pain medicine was prescribed. If you have chronic liver or kidney disease or ever had a stomach ulcer or GI (gastrointestinal) bleeding, talk with your healthcare provider before using these medicines.    If your pain is not controlled, contact your healthcare provider. Sometimes a stronger pain medicine may be needed. A nerve block can be done in  case of severe pain. It will numb the nerve between the ribs.  Follow-up care  Follow up with your healthcare provider, or as advised. In rare cases, a broken rib will cause complications in the first few days that may not be clearly seen during your initial exam. This can include collapsed lung, bleeding around the lung or into the belly (abdomen), or pneumonia. So watch for the signs below.  If X-rays were taken, you will be told of any new findings that may affect your care.  Call 911  Call 911 if you have:    Dizziness, weakness or fainting    Shortness of breath with or without chest discomfort    New or worsening abdominal pain    Discomfort in other areas of your upper body such as your shoulders, jaw, neck, or arms  When to seek medical advice  Call your healthcare provider right away if any of these occur:    Increasing chest pain with breathing    Fever of 100.4 F (38 C) or above, or as directed by your healthcare provider    Congested cough, nausea, or vomiting  Date Last Reviewed: 4/1/2018 2000-2019 [employer]. [address] [place] [address], [place] [address]. All rights reserved. This information is not intended as a substitute for professional medical care. Always follow your healthcare professional's instructions.

## 2020-05-27 NOTE — PROGRESS NOTES
"Soraya Vann is a 45 year old female who is being evaluated via a billable video visit.      The patient has been notified of following:     \"This video visit will be conducted via a call between you and your physician/provider. We have found that certain health care needs can be provided without the need for an in-person physical exam.  This service lets us provide the care you need with a video conversation.  If a prescription is necessary we can send it directly to your pharmacy.  If lab work is needed we can place an order for that and you can then stop by our lab to have the test done at a later time.    Video visits are billed at different rates depending on your insurance coverage.  Please reach out to your insurance provider with any questions.    If during the course of the call the physician/provider feels a video visit is not appropriate, you will not be charged for this service.\"    Patient has given verbal consent for Video visit? Yes    How would you like to obtain your AVS? MyChart    Patient would like the video invitation sent by: Text to cell phone: [phone]    Will anyone else be joining your video visit? No        Video-Visit Details    Type of service:  Video Visit    Video Start Time: 10:59  Video End Time: 11:08    Originating Location (pt. Location): Home    Distant Location (provider location):  [facility]     Platform used for Video Visit: Conner Hart MD          SUBJECTIVE:                                                   Soraya Vann is a 45 year old female who presents to clinic today for the following health issue(s):  Patient presents with:  Consult: finding on recent CT showed left ovarian cyst      HPI:  Had ER visit for upper back pain. Had CT for eval. Found 2cm cyst of left ovary and some free fluid around it. Pt was D#2 of her cycle. No pelvic pain.   Hx of ovarian cyst in past. Hx of normla pelvic ultrasound in " . No cyst at that time.  Has FH breast cancer.  ER MD told pt to f/u with me due to cyst    Patient's last menstrual period was 2020..   Patient is sexually active, .  Using vasectomy for contraception.    reports that she has been smoking. She has been smoking about 1.00 pack per day. She has never used smokeless tobacco.  Health maintenance updated:  yes    Today's PHQ-2 Score:   PHQ-2 (  [employer]) 2020   Q1: Little interest or pleasure in doing things 0   Q2: Feeling down, depressed or hopeless 0   PHQ-2 Score 0     Today's PHQ-9 Score:   PHQ-9 SCORE 2019   PHQ-9 Total Score 1     Today's ROHAN-7 Score:   ROHAN-7 SCORE 2019   Total Score 0       Problem list and histories reviewed & adjusted, as indicated.  Additional history: as documented.    Patient Active Problem List   Diagnosis     Complex cyst of left ovary     Closed fracture of one rib of left side, sequela     Past Surgical History:   Procedure Laterality Date     APPENDECTOMY       COMBINED CYSTOSCOPY, INSERT CATHETER URETER  2013    Procedure: COMBINED CYSTOSCOPY, INSERT CATHETER URETER;;  Surgeon: Jaycob Rdz MD;  Location:  OR     EXTRACORPOREAL SHOCK WAVE LITHOTRIPSY (ESWL)  2013    Procedure: EXTRACORPOREAL SHOCK WAVE LITHOTRIPSY (ESWL);  CYSTOSCOPY, LEFT URETERAL catheter, LEFT EXTRACORPOREAL SHOCK WAVE LITHOTRIPSY ;  Surgeon: Jaycob Rdz MD;  Location:  OR     ORTHOPEDIC SURGERY      left wrist      Social History     Tobacco Use     Smoking status: Current Every Day Smoker     Packs/day: 1.00     Smokeless tobacco: Never Used   Substance Use Topics     Alcohol use: Yes     Alcohol/week: 7.0 standard drinks     Types: 7 Cans of beer per week     Comment: daily      Problem (# of Occurrences) Relation (Name,Age of Onset)    Breast Cancer (5) Mother (55), Paternal Grandmother, Maternal Aunt (65), Maternal Aunt (65), Maternal Aunt (65)    Heart Disease (1) Maternal Grandfather     Hypertension (3) Mother, Father, Brother    Prostate Cancer (1) Brother (40)            Current Outpatient Medications   Medication Sig     cyclobenzaprine (FLEXERIL) 10 MG tablet Take 1 tablet (10 mg) by mouth 3 times daily as needed for muscle spasms     ibuprofen (ADVIL/MOTRIN) 600 MG tablet Take 1 tablet (600 mg) by mouth every 8 hours as needed for moderate pain     Lidocaine (LIDOCARE) 4 % Patch Place 1 patch onto the skin every 24 hours To prevent lidocaine toxicity, patient should be patch free for 12 hrs daily.     Lisdexamfetamine Dimesylate (VYVANSE PO) Take 70 mg by mouth daily      sertraline (ZOLOFT) 25 MG tablet      No current facility-administered medications for this visit.      Allergies   Allergen Reactions     Augmentin GI Disturbance     Penicillins GI Disturbance       ROS:  12 point review of systems negative other than symptoms noted below or in the HPI.        OBJECTIVE:     LMP 05/21/2020   There is no height or weight on file to calculate BMI.    Exam:       In-Clinic Test Results:  No results found for this or any previous visit (from the past 24 hour(s)).    ASSESSMENT/PLAN:                                                        ICD-10-CM    1. Complex cyst of left ovary  N83.292        Discussed CT findings and day of cycle. Most likely resolving CL cyst. Pt had neg ultrasound in DEC. She is asymptomatic so nothing needs to be done and no f/u needed unless develops pain.  Pt happy with plan.    Lonny Hart MD  [facility]

## 2020-05-28 ENCOUNTER — VIRTUAL VISIT (OUTPATIENT)
Dept: OBGYN | Facility: CLINIC | Age: 45
End: 2020-05-28
Payer: COMMERCIAL

## 2020-05-28 DIAGNOSIS — N83.292 COMPLEX CYST OF LEFT OVARY: Primary | ICD-10-CM

## 2020-05-28 PROCEDURE — 99212 OFFICE O/P EST SF 10 MIN: CPT | Mod: 95 | Performed by: OBSTETRICS & GYNECOLOGY

## 2020-12-20 ENCOUNTER — HEALTH MAINTENANCE LETTER (OUTPATIENT)
Age: 45
End: 2020-12-20

## 2021-01-15 ENCOUNTER — HEALTH MAINTENANCE LETTER (OUTPATIENT)
Age: 46
End: 2021-01-15

## 2021-01-15 DIAGNOSIS — Z12.31 VISIT FOR SCREENING MAMMOGRAM: ICD-10-CM

## 2021-01-15 PROCEDURE — 77063 BREAST TOMOSYNTHESIS BI: CPT | Mod: TC | Performed by: RADIOLOGY

## 2021-01-15 PROCEDURE — 77067 SCR MAMMO BI INCL CAD: CPT | Mod: TC | Performed by: RADIOLOGY

## 2021-10-03 ENCOUNTER — HEALTH MAINTENANCE LETTER (OUTPATIENT)
Age: 46
End: 2021-10-03

## 2022-01-23 ENCOUNTER — HEALTH MAINTENANCE LETTER (OUTPATIENT)
Age: 47
End: 2022-01-23

## 2022-03-20 ENCOUNTER — HEALTH MAINTENANCE LETTER (OUTPATIENT)
Age: 47
End: 2022-03-20

## 2022-09-10 ENCOUNTER — HEALTH MAINTENANCE LETTER (OUTPATIENT)
Age: 47
End: 2022-09-10

## 2023-03-21 DIAGNOSIS — N39.0 UTI (URINARY TRACT INFECTION): Primary | ICD-10-CM

## 2023-03-21 NOTE — PROGRESS NOTES
SUBJECTIVE:                                                   Soraya Vann is a 48 year old female who presents to clinic today for the following health issue(s):  Patient presents with:  UTI: Burning with urination, vaginal dryness, vaginal odor      HPI:  Thanh presents as a follow up from Urgent Care. She was seen for a UTI and was initially given Macrobid. Her urine culture showed Group B strep and Enterobacter. She was changed to Bactrim and states that the urinary symptoms are not completely resolved. She continues to have intermittent painful urination. She has GI intolerance to Penicillin and so the GBS was not completely covered. One day before her menses started she stared to have vaginal burning with intercourse.  Concurrently she noted worsening facial acne with cystic acne. She has intermittent acne but never cystic. She does have a history of accutane use in her 20s and is hoping she does not have to go back on it.     Patient's last menstrual period was 2023..   Patient is sexually active, .  Using vasectomy for contraception.    reports that she has been smoking cigarettes. She has been smoking an average of 1 pack per day. She has never used smokeless tobacco.  STD testing offered?  Declined  Health maintenance updated:  no    Today's PHQ-2 Score:   PHQ-2 (  [employer]) 2020   Q1: Little interest or pleasure in doing things 0   Q2: Feeling down, depressed or hopeless 0   PHQ-2 Score 0   PHQ-2 Total Score (12-17 Years)- Positive if 3 or more points; Administer PHQ-A if positive 0     Today's PHQ-9 Score:   PHQ-9 SCORE 3/22/2023   PHQ-9 Total Score 0     Today's ROHAN-7 Score:   ROHAN-7 SCORE 3/22/2023   Total Score 0       Problem list and histories reviewed & adjusted, as indicated.  Additional history: as documented.    Patient Active Problem List   Diagnosis     Complex cyst of left ovary     Closed fracture of one rib of left side, sequela     Past Surgical History:    Procedure Laterality Date     APPENDECTOMY       COMBINED CYSTOSCOPY, INSERT CATHETER URETER  12/24/2013    Procedure: COMBINED CYSTOSCOPY, INSERT CATHETER URETER;;  Surgeon: Jaycob Rdz MD;  Location: [facility]     EXTRACORPOREAL SHOCK WAVE LITHOTRIPSY (ESWL)  12/24/2013    Procedure: EXTRACORPOREAL SHOCK WAVE LITHOTRIPSY (ESWL);  CYSTOSCOPY, LEFT URETERAL catheter, LEFT EXTRACORPOREAL SHOCK WAVE LITHOTRIPSY ;  Surgeon: Jaycob Rdz MD;  Location:  OR     ORTHOPEDIC SURGERY      left wrist      Social History     Tobacco Use     Smoking status: Every Day     Packs/day: 1.00     Types: Cigarettes     Smokeless tobacco: Never   Substance Use Topics     Alcohol use: Yes     Alcohol/week: 7.0 standard drinks     Types: 7 Cans of beer per week     Comment: daily      Problem (# of Occurrences) Relation (Name,Age of Onset)    Heart Disease (1) Maternal Grandfather    Hypertension (3) Mother, Father, Brother    Breast Cancer (5) Mother (55), Paternal Grandmother, Maternal Aunt (65), Maternal Aunt (65), Maternal Aunt (65)    Prostate Cancer (1) Brother (40)            Current Outpatient Medications   Medication Sig     cefdinir (OMNICEF) 300 MG capsule Take 1 capsule (300 mg) by mouth 2 times daily for 7 days     fluconazole (DIFLUCAN) 150 MG tablet Take 1 tablet (150 mg) by mouth every 3 days for 3 doses     lisdexamfetamine (VYVANSE) 70 MG capsule Take 70 mg by mouth     sertraline (ZOLOFT) 25 MG tablet Take 25 mg by mouth     spironolactone (ALDACTONE) 50 MG tablet Take 1 tablet (50 mg) by mouth daily     propranolol (INDERAL) 40 MG tablet      No current facility-administered medications for this visit.     Allergies   Allergen Reactions     Augmentin GI Disturbance     Penicillins GI Disturbance       ROS:  12 point review of systems negative other than symptoms noted below or in the HPI.  Genitourinary: Painful [place], Painful Urination and Vaginal Discharge  dysuria, urgency, vaginal itching  "or burning      OBJECTIVE:     /78   Temp 97.9  F (36.6  C) (Oral)   Ht 1.626 m (5' 4\")   Wt 58.5 kg (129 lb)   LMP 03/19/2023   BMI 22.14 kg/m    Body mass index is 22.14 kg/m .    Exam:  Constitutional:  Appearance: Well nourished, well developed alert, in no acute distress  Skin: General Inspection:  No rashes present, no lesions present, no areas of discoloration.  Neurologic:  Mental Status:  Oriented X3.  Normal strength and tone, sensory exam grossly normal, mentation intact and speech normal.    Psychiatric:  Mentation appears normal and affect normal/bright.  External Genitalia: erythema on labia majora bilaterally  Vagina: moderate menses present. Unable to appreciate separate discharge.  Cervix: appears normal      ASSESSMENT/PLAN:                                                        ICD-10-CM    1. Dysuria  R30.0 Urine Culture Aerobic Bacterial     Urine Culture Aerobic Bacterial     cefdinir (OMNICEF) 300 MG capsule      2. UTI (urinary tract infection)  N39.0 UA with Microscopic - lab collect     Urine Microscopic Exam      3. Vaginal burning  N94.9 Bacterial Vaginosis Smear     fluconazole (DIFLUCAN) 150 MG tablet      4. Acne vulgaris  L70.0 spironolactone (ALDACTONE) 50 MG tablet        UTI: I recommend a third general cephalosporin based on her urine culture sensitivities to adequately cover both bacteria. This was sent today.  Her vaginitis symptoms are likely due to a yeast infection although no clear discharge was seen today. Will send an eswab and give a prolonged course of fluconazole as I am still treating her for another 7 days.  Acne Vulgaris: hormonal acne is the most likely diagnosis. I recommend using oral spironolactone due to the cystic nature. Discussed that it may take 3 full months to see a full effect.    Mariela Arroyo MD  [facility] [place]  "

## 2023-03-22 ENCOUNTER — OFFICE VISIT (OUTPATIENT)
Dept: OBGYN | Facility: CLINIC | Age: 48
End: 2023-03-22
Payer: COMMERCIAL

## 2023-03-22 VITALS
TEMPERATURE: 97.9 F | WEIGHT: 129 LBS | BODY MASS INDEX: 22.02 KG/M2 | SYSTOLIC BLOOD PRESSURE: 124 MMHG | DIASTOLIC BLOOD PRESSURE: 78 MMHG | HEIGHT: 64 IN

## 2023-03-22 DIAGNOSIS — R30.0 DYSURIA: Primary | ICD-10-CM

## 2023-03-22 DIAGNOSIS — L70.0 ACNE VULGARIS: ICD-10-CM

## 2023-03-22 DIAGNOSIS — N94.89 VAGINAL BURNING: ICD-10-CM

## 2023-03-22 LAB
ALBUMIN UR-MCNC: 30 MG/DL
APPEARANCE UR: CLEAR
BACTERIA #/AREA URNS HPF: ABNORMAL /HPF
BACTERIAL VAGINOSIS SMEAR: ABNORMAL
BILIRUB UR QL STRIP: NEGATIVE
CAOX CRY #/AREA URNS HPF: ABNORMAL /HPF
COLOR UR AUTO: YELLOW
GLUCOSE UR STRIP-MCNC: NEGATIVE MG/DL
HGB UR QL STRIP: ABNORMAL
KETONES UR STRIP-MCNC: 15 MG/DL
LEUKOCYTE ESTERASE UR QL STRIP: ABNORMAL
NITRATE UR QL: NEGATIVE
NUGENT SCORE: 1
PH UR STRIP: 5.5 [PH] (ref 5–7)
RBC #/AREA URNS AUTO: ABNORMAL /HPF
SP GR UR STRIP: >=1.03 (ref 1–1.03)
SQUAMOUS #/AREA URNS AUTO: ABNORMAL /LPF
UROBILINOGEN UR STRIP-ACNC: 0.2 E.U./DL
WBC #/AREA URNS AUTO: ABNORMAL /HPF
WHITE BLOOD CELLS: NORMAL

## 2023-03-22 PROCEDURE — 81001 URINALYSIS AUTO W/SCOPE: CPT | Performed by: OBSTETRICS & GYNECOLOGY

## 2023-03-22 PROCEDURE — 99214 OFFICE O/P EST MOD 30 MIN: CPT | Performed by: OBSTETRICS & GYNECOLOGY

## 2023-03-22 PROCEDURE — 87205 SMEAR GRAM STAIN: CPT | Performed by: OBSTETRICS & GYNECOLOGY

## 2023-03-22 PROCEDURE — 87086 URINE CULTURE/COLONY COUNT: CPT | Performed by: OBSTETRICS & GYNECOLOGY

## 2023-03-22 RX ORDER — PROPRANOLOL HYDROCHLORIDE 40 MG/1
TABLET ORAL
COMMUNITY
Start: 2022-11-15

## 2023-03-22 RX ORDER — FLUCONAZOLE 150 MG/1
150 TABLET ORAL
Qty: 3 TABLET | Refills: 0 | Status: SHIPPED | OUTPATIENT
Start: 2023-03-22 | End: 2023-03-29

## 2023-03-22 RX ORDER — CEFDINIR 300 MG/1
300 CAPSULE ORAL 2 TIMES DAILY
Qty: 14 CAPSULE | Refills: 0 | Status: SHIPPED | OUTPATIENT
Start: 2023-03-22 | End: 2023-03-29

## 2023-03-22 RX ORDER — SPIRONOLACTONE 50 MG/1
50 TABLET, FILM COATED ORAL DAILY
Qty: 90 TABLET | Refills: 3 | Status: SHIPPED | OUTPATIENT
Start: 2023-03-22

## 2023-03-22 ASSESSMENT — ANXIETY QUESTIONNAIRES
6. BECOMING EASILY ANNOYED OR IRRITABLE: NOT AT ALL
5. BEING SO RESTLESS THAT IT IS HARD TO SIT STILL: NOT AT ALL
7. FEELING AFRAID AS IF SOMETHING AWFUL MIGHT HAPPEN: NOT AT ALL
3. WORRYING TOO MUCH ABOUT DIFFERENT THINGS: NOT AT ALL
1. FEELING NERVOUS, ANXIOUS, OR ON EDGE: NOT AT ALL
GAD7 TOTAL SCORE: 0
2. NOT BEING ABLE TO STOP OR CONTROL WORRYING: NOT AT ALL
GAD7 TOTAL SCORE: 0
IF YOU CHECKED OFF ANY PROBLEMS ON THIS QUESTIONNAIRE, HOW DIFFICULT HAVE THESE PROBLEMS MADE IT FOR YOU TO DO YOUR WORK, TAKE CARE OF THINGS AT HOME, OR GET ALONG WITH OTHER PEOPLE: NOT DIFFICULT AT ALL

## 2023-03-22 ASSESSMENT — PATIENT HEALTH QUESTIONNAIRE - PHQ9
5. POOR APPETITE OR OVEREATING: NOT AT ALL
SUM OF ALL RESPONSES TO PHQ QUESTIONS 1-9: 0

## 2023-03-24 LAB — BACTERIA UR CULT: NORMAL

## 2023-04-30 ENCOUNTER — HEALTH MAINTENANCE LETTER (OUTPATIENT)
Age: 48
End: 2023-04-30

## 2023-08-02 ENCOUNTER — TELEPHONE (OUTPATIENT)
Dept: OBGYN | Facility: CLINIC | Age: 48
End: 2023-08-02
Payer: COMMERCIAL

## 2023-08-02 NOTE — TELEPHONE ENCOUNTER
"Patient calling in the clinic requesting an antibiotic. Notes \"I am frustrated, I have a UTI and I need an antibiotic.\" Discussed with patient that it is the end of the day and that she would need an evaluation in order to be treated for a possible UTI. Discussed this is the end of the day and providers are all done seeing patients for the day. Discussed can go to UC or urgency room and patient declines. Notes she is having a lot of pain and pressure with voiding. States she has an appointment at  however is not until 7pm tonight. Discussed that I have no providers here and that she should be evaluated at UC. Patient notes she cannot make it to her appointment and wanting to know if any appointments tomorrow. Discussed we had an opening at 10am tomorrow and patient notes this doesn't work for her and needs appt after 1pm. Discussed no appointments available in the afternoon. However ifd she is wanting urgent evaluation UC or urgency room would be a better option versus waiting with what she is describing that she is in a lot of discomfort. Patient states that wont work for her and hung up phone.    Margareth Price RN on 8/2/2023 at 4:50 PM    "

## 2023-08-21 ENCOUNTER — MYC MEDICAL ADVICE (OUTPATIENT)
Dept: OBGYN | Facility: CLINIC | Age: 48
End: 2023-08-21
Payer: COMMERCIAL

## 2023-08-21 NOTE — TELEPHONE ENCOUNTER
Panel Management Review    Date of last visit with a [facility] provider: Dr. Arroyo on 03/22/23.  Date of next visit with a [facility] provider: None.    Health Maintenance List    Health Maintenance   Topic Date Due    ADVANCE CARE PLANNING  Never done    HEPATITIS B IMMUNIZATION (1 of 3 - 3-dose series) Never done    Pneumococcal Vaccine: Pediatrics (0 to 5 Years) and At-Risk Patients (6 to 64 Years) (1 - PCV) Never done    COLORECTAL CANCER SCREENING  Never done    HIV SCREENING  Never done    HEPATITIS C SCREENING  Never done    YEARLY PREVENTIVE VISIT  12/06/2020    NICOTINE/TOBACCO CESSATION COUNSELING Q 1 YR  12/13/2020    LIPID  Never done    PAP  08/06/2021    COVID-19 Vaccine (3 - Moderna series) 11/08/2021    MAMMO SCREENING  01/15/2022    INFLUENZA VACCINE (1) 09/01/2023    DTAP/TDAP/TD IMMUNIZATION (2 - Td or Tdap) 08/18/2026    PHQ-2 (once per calendar year)  Completed    IPV IMMUNIZATION  Aged Out    MENINGITIS IMMUNIZATION  Aged Out       Composite cancer screening  Chart review shows that this patient is due/due soon for the following Pap Smear  No results found for: PAP  Past Surgical History:   Procedure Laterality Date    APPENDECTOMY      COMBINED CYSTOSCOPY, INSERT CATHETER URETER  12/24/2013    Procedure: COMBINED CYSTOSCOPY, INSERT CATHETER URETER;;  Surgeon: Jaycob Rdz MD;  Location:  OR    EXTRACORPOREAL SHOCK WAVE LITHOTRIPSY (ESWL)  12/24/2013    Procedure: EXTRACORPOREAL SHOCK WAVE LITHOTRIPSY (ESWL);  CYSTOSCOPY, LEFT URETERAL catheter, LEFT EXTRACORPOREAL SHOCK WAVE LITHOTRIPSY ;  Surgeon: Jaycob Rdz MD;  Location:  OR    ORTHOPEDIC SURGERY      left wrist       Is hysterectomy listed in surgical history? No     Summary:    Patient is due/failing the following:   Pap Smear    Action needed: Patient needs office visit for pap.    Type of outreach:  Sent [employer] message.      Staff Signature:  Josy Peck LPN on 8/21/2023 at 1:35 PM

## 2023-12-01 NOTE — PROGRESS NOTES
SUBJECTIVE:                                                   Soraya Vann is a 48 year old female who presents to clinic today for the following health issue(s):  Patient presents with:  Urinary Problem: Doesn't currently have UTI symptoms, would like to discuss preventative measures      Additional information: blood in urine, reoccurring UTI    HPI:  Patient states over the last year she has had 10-12 UTI's, that have resolved with medication.  She States she has never had this before. She does drink a lot of caffeine, and not much water, which she is trying to change now.  She has started cranberry pills.  She does not use fragrant soaps.  She does notice her symptoms can come after IC.  She states she does urinate within 30 minutes after IC.  She denies having any symptoms right now.      Patient's last menstrual period was 2023 (exact date)..     Patient is sexually active, .  Using vasectomy for contraception.    reports that she has been smoking cigarettes. She has been smoking an average of 1 pack per day. She has never used smokeless tobacco.  STD testing offered?  Declined    Health maintenance updated:  yes    Today's PHQ-2 Score:       2020    11:09 AM   PHQ-2 (  [employer])   Q1: Little interest or pleasure in doing things 0   Q2: Feeling down, depressed or hopeless 0   PHQ-2 Score 0   PHQ-2 Total Score (12-17 Years)- Positive if 3 or more points; Administer PHQ-A if positive 0     Today's PHQ-9 Score:       3/22/2023    11:42 AM   PHQ-9 SCORE   PHQ-9 Total Score 0     Today's ROHAN-7 Score:       3/22/2023    11:42 AM   ROHAN-7 SCORE   Total Score 0       Problem list and histories reviewed & adjusted, as indicated.  Additional history: as documented.    Patient Active Problem List   Diagnosis    Complex cyst of left ovary    Closed fracture of one rib of left side, sequela     Past Surgical History:   Procedure Laterality Date    APPENDECTOMY      COMBINED CYSTOSCOPY,  "INSERT CATHETER URETER  12/24/2013    Procedure: COMBINED CYSTOSCOPY, INSERT CATHETER URETER;;  Surgeon: Jaycob Rdz MD;  Location:  OR    EXTRACORPOREAL SHOCK WAVE LITHOTRIPSY (ESWL)  12/24/2013    Procedure: EXTRACORPOREAL SHOCK WAVE LITHOTRIPSY (ESWL);  CYSTOSCOPY, LEFT URETERAL catheter, LEFT EXTRACORPOREAL SHOCK WAVE LITHOTRIPSY ;  Surgeon: Jaycob Rdz MD;  Location:  OR    ORTHOPEDIC SURGERY      left wrist      Social History     Tobacco Use    Smoking status: Every Day     Packs/day: 1     Types: Cigarettes    Smokeless tobacco: Never   Substance Use Topics    Alcohol use: Yes     Alcohol/week: 7.0 standard drinks of alcohol     Types: 7 Cans of beer per week     Comment: daily      Problem (# of Occurrences) Relation (Name,Age of Onset)    Heart Disease (1) Maternal Grandfather    Hypertension (3) Mother, Father, Brother    Breast Cancer (5) Mother (55), Paternal Grandmother, Maternal Aunt (65), Maternal Aunt (65), Maternal Aunt (65)    Prostate Cancer (1) Brother (40)              Current Outpatient Medications   Medication Sig    lisdexamfetamine (VYVANSE) 70 MG capsule Take 70 mg by mouth    nitroFURantoin macrocrystal-monohydrate (MACROBID) 100 MG capsule Take 1 capsule (100 mg) by mouth as needed NEW SIG:  Take 1 capsule by mouth prn after intercourse.  If UTI symptoms develop take 1 capsule (100)mg bid for 3 days.    propranolol (INDERAL) 40 MG tablet     sertraline (ZOLOFT) 25 MG tablet Take 25 mg by mouth    spironolactone (ALDACTONE) 50 MG tablet Take 1 tablet (50 mg) by mouth daily (Patient not taking: Reported on 12/4/2023)     No current facility-administered medications for this visit.     Allergies   Allergen Reactions    Amoxicillin-Pot Clavulanate GI Disturbance    Penicillins GI Disturbance       ROS:  Genitourinary: Painful [place] and Urgency  Normal menstrual cycles      OBJECTIVE:     BP (!) 154/92   Ht 1.626 m (5' 4\")   Wt 54.5 kg (120 lb 3.2 oz)   LMP " 11/26/2023 (Exact Date)   BMI 20.63 kg/m    Body mass index is 20.63 kg/m .    Exam:  Constitutional:  Appearance: Well nourished, well developed alert, in no acute distress  Neurologic:  Mental Status:  Oriented X3.  Normal strength and tone, sensory exam grossly normal, mentation intact and speech normal.    Psychiatric:  Mentation appears normal and affect normal/bright.     In-Clinic Test Results:  Results for orders placed or performed in visit on 12/04/23 (from the past 24 hour(s))   UA with Microscopic - lab collect   Result Value Ref Range    Color Urine Yellow Colorless, Straw, Light Yellow, Yellow    Appearance Urine Clear Clear    Glucose Urine Negative Negative mg/dL    Bilirubin Urine Negative Negative    Ketones Urine Trace (A) Negative mg/dL    Specific Gravity Urine 1.020 1.003 - 1.035    Blood Urine Negative Negative    pH Urine 6.0 5.0 - 7.0    Protein Albumin Urine Negative Negative mg/dL    Urobilinogen Urine 0.2 0.2, 1.0 E.U./dL    Nitrite Urine Negative Negative    Leukocyte Esterase Urine Negative Negative   Urine Microscopic Exam   Result Value Ref Range    Bacteria Urine Few (A) None Seen /HPF    RBC Urine 2-5 (A) 0-2 /HPF /HPF    WBC Urine 5-10 (A) 0-5 /HPF /HPF    Squamous Epithelials Urine Few (A) None Seen /LPF       ASSESSMENT/PLAN:                                                        ICD-10-CM    1. Dysuria  R30.0 UA with Microscopic - lab collect     UA with Microscopic - lab collect     Urine Microscopic Exam     Urine Culture     nitroFURantoin macrocrystal-monohydrate (MACROBID) 100 MG capsule        Will send a UC.  Discussed importance of increasing water intake.  Also discussed trying to take 1 macrobid after IC to try to help prevent UTI's.  Patient would like to try that.  Also recommended if these symptoms continue to see a urologist for a work up for frequent UTI's.  Patient is okay with this plan.  Return prn.    WILL Chambers CNP  [facility] [place]     Radial fracture Hip fracture, left

## 2023-12-04 ENCOUNTER — OFFICE VISIT (OUTPATIENT)
Dept: OBGYN | Facility: CLINIC | Age: 48
End: 2023-12-04
Payer: COMMERCIAL

## 2023-12-04 VITALS
BODY MASS INDEX: 20.52 KG/M2 | WEIGHT: 120.2 LBS | SYSTOLIC BLOOD PRESSURE: 154 MMHG | HEIGHT: 64 IN | DIASTOLIC BLOOD PRESSURE: 92 MMHG

## 2023-12-04 DIAGNOSIS — R30.0 DYSURIA: Primary | ICD-10-CM

## 2023-12-04 LAB
ALBUMIN UR-MCNC: NEGATIVE MG/DL
APPEARANCE UR: CLEAR
BACTERIA #/AREA URNS HPF: ABNORMAL /HPF
BILIRUB UR QL STRIP: NEGATIVE
COLOR UR AUTO: YELLOW
GLUCOSE UR STRIP-MCNC: NEGATIVE MG/DL
HGB UR QL STRIP: NEGATIVE
KETONES UR STRIP-MCNC: ABNORMAL MG/DL
LEUKOCYTE ESTERASE UR QL STRIP: NEGATIVE
NITRATE UR QL: NEGATIVE
PH UR STRIP: 6 [PH] (ref 5–7)
RBC #/AREA URNS AUTO: ABNORMAL /HPF
SP GR UR STRIP: 1.02 (ref 1–1.03)
SQUAMOUS #/AREA URNS AUTO: ABNORMAL /LPF
UROBILINOGEN UR STRIP-ACNC: 0.2 E.U./DL
WBC #/AREA URNS AUTO: ABNORMAL /HPF

## 2023-12-04 PROCEDURE — 99213 OFFICE O/P EST LOW 20 MIN: CPT | Performed by: NURSE PRACTITIONER

## 2023-12-04 PROCEDURE — 81001 URINALYSIS AUTO W/SCOPE: CPT | Performed by: NURSE PRACTITIONER

## 2023-12-04 PROCEDURE — 87086 URINE CULTURE/COLONY COUNT: CPT | Performed by: NURSE PRACTITIONER

## 2023-12-04 RX ORDER — NITROFURANTOIN 25; 75 MG/1; MG/1
100 CAPSULE ORAL PRN
Qty: 30 CAPSULE | Refills: 0 | Status: SHIPPED | OUTPATIENT
Start: 2023-12-04

## 2023-12-05 LAB — BACTERIA UR CULT: NORMAL

## 2023-12-06 NOTE — RESULT ENCOUNTER NOTE
Soraya      Your urine culture  results are negative.  Would recommend trying the macrobid after intercourse, but if symptoms are persisting please see a urologist for further work up..  If further questions please give me a call.    Meredith Vidales RNC

## 2024-01-12 ENCOUNTER — MYC MEDICAL ADVICE (OUTPATIENT)
Dept: OBGYN | Facility: CLINIC | Age: 49
End: 2024-01-12

## 2024-01-12 NOTE — TELEPHONE ENCOUNTER
"Panel Management Review    Date of last visit with a [facility] provider: Meredith Vidales on 12/04/23.  Date of next visit with a [facility] provider: None.    Health Maintenance List    Health Maintenance   Topic Date Due    ADVANCE CARE PLANNING  Never done    HEPATITIS B IMMUNIZATION (1 of 3 - 3-dose series) Never done    Pneumococcal Vaccine: Pediatrics (0 to 5 Years) and At-Risk Patients (6 to 64 Years) (1 of 2 - PCV) Never done    COLORECTAL CANCER SCREENING  Never done    HIV SCREENING  Never done    HEPATITIS C SCREENING  Never done    YEARLY PREVENTIVE VISIT  12/06/2020    NICOTINE/TOBACCO CESSATION COUNSELING Q 1 YR  12/13/2020    LIPID  Never done    PAP  08/06/2021    MAMMO SCREENING  01/15/2022    INFLUENZA VACCINE (1) 09/01/2023    COVID-19 Vaccine (3 - 2023-24 season) 09/01/2023    PHQ-2 (once per calendar year)  01/01/2024    DTAP/TDAP/TD IMMUNIZATION (2 - Td or Tdap) 08/18/2026    IPV IMMUNIZATION  Aged Out    HPV IMMUNIZATION  Aged Out    MENINGITIS IMMUNIZATION  Aged Out    RSV MONOCLONAL ANTIBODY  Aged Out       Composite cancer screening  Chart review shows that this patient is due/due soon for the following Colonoscopy  No results found for: \"PAP\"  Past Surgical History:   Procedure Laterality Date    APPENDECTOMY      COMBINED CYSTOSCOPY, INSERT CATHETER URETER  12/24/2013    Procedure: COMBINED CYSTOSCOPY, INSERT CATHETER URETER;;  Surgeon: Jaycob Rdz MD;  Location:  OR    EXTRACORPOREAL SHOCK WAVE LITHOTRIPSY (ESWL)  12/24/2013    Procedure: EXTRACORPOREAL SHOCK WAVE LITHOTRIPSY (ESWL);  CYSTOSCOPY, LEFT URETERAL catheter, LEFT EXTRACORPOREAL SHOCK WAVE LITHOTRIPSY ;  Surgeon: Jaycob Rdz MD;  Location:  OR    ORTHOPEDIC SURGERY      left wrist     Summary:    Patient is due/failing the following:   Colonoscopy    Action needed: Patient needs to schedule colonoscopy.    Type of outreach:  Sent [employer] message.      Staff Signature:  Josy Peck LPN on " 1/12/2024 at 2:28 PM

## 2024-07-07 ENCOUNTER — HEALTH MAINTENANCE LETTER (OUTPATIENT)
Age: 49
End: 2024-07-07

## 2025-01-20 DIAGNOSIS — R30.0 DYSURIA: ICD-10-CM

## 2025-01-20 RX ORDER — NITROFURANTOIN 25; 75 MG/1; MG/1
CAPSULE ORAL
Qty: 30 CAPSULE | Refills: 0 | OUTPATIENT
Start: 2025-01-20

## 2025-01-20 NOTE — TELEPHONE ENCOUNTER
Requested Prescriptions   Pending Prescriptions Disp Refills    nitroFURantoin macrocrystal-monohydrate (MACROBID) 100 MG capsule [Pharmacy Med Name: NITROFURANTOIN MONO/MAC 100MG CAPS] 30 capsule 0     Sig: TAKE 1 CAPSULE BY MOUTH AS NEEDED AFTER INTERCOURSE. IF UTI DEVELOP TAKE 1 CAPSULE TWICE DAILY FOR 3 DAYS       There is no refill protocol information for this order        Last Written Prescription Date:  12/4/23  Last Fill Quantity: 30,  # refills: 0   Last office visit: 12/4/2023 ; last virtual visit: Visit date not found with prescribing provider:  RICARDO Vidales NP   Future Office Visit:      Pt due for annual, no appt scheduled. Pt already received one month extension. Rx denied.   Katherine Rg RN on 1/20/2025 at 12:27 PM

## 2025-01-21 ENCOUNTER — OFFICE VISIT (OUTPATIENT)
Dept: OBGYN | Facility: CLINIC | Age: 50
End: 2025-01-21
Payer: COMMERCIAL

## 2025-01-21 VITALS — DIASTOLIC BLOOD PRESSURE: 80 MMHG | SYSTOLIC BLOOD PRESSURE: 130 MMHG | BODY MASS INDEX: 21.42 KG/M2 | WEIGHT: 124.8 LBS

## 2025-01-21 DIAGNOSIS — N30.00 ACUTE CYSTITIS WITHOUT HEMATURIA: Primary | ICD-10-CM

## 2025-01-21 DIAGNOSIS — R39.9 UTI SYMPTOMS: Primary | ICD-10-CM

## 2025-01-21 DIAGNOSIS — R30.0 DYSURIA: ICD-10-CM

## 2025-01-21 DIAGNOSIS — R39.9 UTI SYMPTOMS: ICD-10-CM

## 2025-01-21 LAB
ALBUMIN UR-MCNC: NEGATIVE MG/DL
APPEARANCE UR: ABNORMAL
BACTERIA #/AREA URNS HPF: ABNORMAL /HPF
BILIRUB UR QL STRIP: NEGATIVE
COLOR UR AUTO: ABNORMAL
GLUCOSE UR STRIP-MCNC: NEGATIVE MG/DL
HGB UR QL STRIP: ABNORMAL
KETONES UR STRIP-MCNC: ABNORMAL MG/DL
LEUKOCYTE ESTERASE UR QL STRIP: ABNORMAL
NITRATE UR QL: POSITIVE
PH UR STRIP: 6 [PH] (ref 5–7)
RBC #/AREA URNS AUTO: ABNORMAL /HPF
SP GR UR STRIP: >=1.03 (ref 1–1.03)
SQUAMOUS #/AREA URNS AUTO: ABNORMAL /LPF
UROBILINOGEN UR STRIP-ACNC: 0.2 E.U./DL
WBC #/AREA URNS AUTO: ABNORMAL /HPF

## 2025-01-21 PROCEDURE — 87088 URINE BACTERIA CULTURE: CPT

## 2025-01-21 PROCEDURE — 87186 SC STD MICRODIL/AGAR DIL: CPT

## 2025-01-21 PROCEDURE — 99213 OFFICE O/P EST LOW 20 MIN: CPT

## 2025-01-21 PROCEDURE — 81001 URINALYSIS AUTO W/SCOPE: CPT

## 2025-01-21 PROCEDURE — 87086 URINE CULTURE/COLONY COUNT: CPT

## 2025-01-21 RX ORDER — NITROFURANTOIN 25; 75 MG/1; MG/1
100 CAPSULE ORAL PRN
Qty: 30 CAPSULE | Refills: 0 | Status: SHIPPED | OUTPATIENT
Start: 2025-01-21

## 2025-01-21 RX ORDER — NITROFURANTOIN 25; 75 MG/1; MG/1
100 CAPSULE ORAL 2 TIMES DAILY
Qty: 10 CAPSULE | Refills: 0 | Status: SHIPPED | OUTPATIENT
Start: 2025-01-21 | End: 2025-01-26

## 2025-01-21 ASSESSMENT — PATIENT HEALTH QUESTIONNAIRE - PHQ9
5. POOR APPETITE OR OVEREATING: NOT AT ALL
SUM OF ALL RESPONSES TO PHQ QUESTIONS 1-9: 0

## 2025-01-21 ASSESSMENT — ANXIETY QUESTIONNAIRES
2. NOT BEING ABLE TO STOP OR CONTROL WORRYING: NOT AT ALL
1. FEELING NERVOUS, ANXIOUS, OR ON EDGE: NOT AT ALL
GAD7 TOTAL SCORE: 0
IF YOU CHECKED OFF ANY PROBLEMS ON THIS QUESTIONNAIRE, HOW DIFFICULT HAVE THESE PROBLEMS MADE IT FOR YOU TO DO YOUR WORK, TAKE CARE OF THINGS AT HOME, OR GET ALONG WITH OTHER PEOPLE: NOT DIFFICULT AT ALL
6. BECOMING EASILY ANNOYED OR IRRITABLE: NOT AT ALL
GAD7 TOTAL SCORE: 0
7. FEELING AFRAID AS IF SOMETHING AWFUL MIGHT HAPPEN: NOT AT ALL
3. WORRYING TOO MUCH ABOUT DIFFERENT THINGS: NOT AT ALL
5. BEING SO RESTLESS THAT IT IS HARD TO SIT STILL: NOT AT ALL

## 2025-01-21 NOTE — PROGRESS NOTES
SUBJECTIVE:                                                   Soraya Vann is a 49 year old female who presents to clinic today for the following health issue(s):  Patient presents with:  Recurrent UTI: Patient states she has history of UTI's and she gets them often after sex. Patient reports having urgency, dysuria and frequency since yesterday.    HPI:  Soraya is a 50 yo female with PMHx recurrent UTIs who presents with urinary urgency, frequency, and burning with urination x 1 day.     She reports that last year she had a significant increase in UTIs associated with intercourse / sexual activity. She had between 10-12 UTIs, and was then seen in our clinic for her symptoms. She was started on a cranberry supplement and post-coital Macrobid which completely resolved her UTIs.     She and her partner broke up, was not sexually active and no longer had symptoms or required preventive medications. She is now back together with partner, sexually active again, and presenting with UTI symptoms.     She denies fever, chills, low back pain, blood in urine.     Patient's last menstrual period was 2024 (approximate)..     Patient is sexually active, .  Using none for contraception.    reports that she has been smoking cigarettes. She has never used smokeless tobacco.  Tobacco Cessation Action Plan: Information offered: Patient not interested at this time  STD testing offered?  Declined    Health maintenance updated:  yes    Today's PHQ-2 Score:       2020    11:09 AM   PHQ-2 (  [employer])   Q1: Little interest or pleasure in doing things 0   Q2: Feeling down, depressed or hopeless 0   PHQ-2 Score 0   PHQ-2 Total Score (12-17 Years)- Positive if 3 or more points; Administer PHQ-A if positive 0     Today's PHQ-9 Score:       3/22/2023    11:42 AM   PHQ-9 SCORE   PHQ-9 Total Score 0     Today's ROHAN-7 Score:       3/22/2023    11:42 AM   ROHAN-7 SCORE   Total Score 0       Problem list and  histories reviewed & adjusted, as indicated.  Additional history: as documented.    Patient Active Problem List   Diagnosis    Complex cyst of left ovary    Closed fracture of one rib of left side, sequela     Past Surgical History:   Procedure Laterality Date    APPENDECTOMY      COMBINED CYSTOSCOPY, INSERT CATHETER URETER  12/24/2013    Procedure: COMBINED CYSTOSCOPY, INSERT CATHETER URETER;;  Surgeon: Jaycob Rdz MD;  Location:  OR    EXTRACORPOREAL SHOCK WAVE LITHOTRIPSY (ESWL)  12/24/2013    Procedure: EXTRACORPOREAL SHOCK WAVE LITHOTRIPSY (ESWL);  CYSTOSCOPY, LEFT URETERAL catheter, LEFT EXTRACORPOREAL SHOCK WAVE LITHOTRIPSY ;  Surgeon: Jaycob Rdz MD;  Location:  OR    ORTHOPEDIC SURGERY      left wrist      Social History     Tobacco Use    Smoking status: Every Day     Current packs/day: 1.00     Types: Cigarettes    Smokeless tobacco: Never   Substance Use Topics    Alcohol use: Yes     Alcohol/week: 7.0 standard drinks of alcohol     Types: 7 Cans of beer per week     Comment: daily      Problem (# of Occurrences) Relation (Name,Age of Onset)    Heart Disease (1) Maternal Grandfather    Hypertension (3) Mother, Father, Brother    Breast Cancer (5) Mother (55), Paternal Grandmother, Maternal Aunt (65), Maternal Aunt (65), Maternal Aunt (65)    Prostate Cancer (1) Brother (40)              Current Outpatient Medications   Medication Sig Dispense Refill    Cranberry 500 MG TABS Take 1 tablet by mouth daily as needed (for UTI symptoms). 30 tablet 11    lisdexamfetamine (VYVANSE) 70 MG capsule Take 70 mg by mouth      nitroFURantoin macrocrystal-monohydrate (MACROBID) 100 MG capsule Take 1 capsule (100 mg) by mouth as needed (post-coital for UTI prevention). NEW SIG:  Take 1 capsule by mouth prn after intercourse.  If UTI symptoms develop take 1 capsule (100)mg bid for 3 days. 30 capsule 0    nitroFURantoin macrocrystal-monohydrate (MACROBID) 100 MG capsule Take 1 capsule (100 mg) by  mouth 2 times daily for 5 days. 10 capsule 0    propranolol (INDERAL) 40 MG tablet       sertraline (ZOLOFT) 25 MG tablet Take 25 mg by mouth      spironolactone (ALDACTONE) 50 MG tablet Take 1 tablet (50 mg) by mouth daily (Patient not taking: Reported on 1/21/2025) 90 tablet 3     No current facility-administered medications for this visit.     Allergies   Allergen Reactions    Amoxicillin-Pot Clavulanate GI Disturbance    Penicillins GI Disturbance     OBJECTIVE:     /80 (BP Location: Right arm, Patient Position: Sitting, Cuff Size: Adult Regular)   Wt 56.6 kg (124 lb 12.8 oz)   LMP 12/24/2024 (Approximate)   BMI 21.42 kg/m    Body mass index is 21.42 kg/m .    Exam:  Constitutional:  Appearance: Well nourished, well developed alert, in no acute distress  Neurologic:  Mental Status:  Oriented X3.  Normal strength and tone, sensory exam grossly normal, mentation intact and speech normal.    Psychiatric:  Mentation appears normal and affect normal/bright.     In-Clinic Test Results:  Results for orders placed or performed in visit on 01/21/25 (from the past 24 hours)   UA with Microscopic   Result Value Ref Range    Color Urine Ashley (A) Colorless, Straw, Light Yellow, Yellow    Appearance Urine Slightly Cloudy (A) Clear    Glucose Urine Negative Negative mg/dL    Bilirubin Urine Negative Negative    Ketones Urine Trace (A) Negative mg/dL    Specific Gravity Urine >=1.030 1.003 - 1.035    Blood Urine Trace (A) Negative    pH Urine 6.0 5.0 - 7.0    Protein Albumin Urine Negative Negative mg/dL    Urobilinogen Urine 0.2 0.2, 1.0 E.U./dL    Nitrite Urine Positive (A) Negative    Leukocyte Esterase Urine Small (A) Negative   Urine Microscopic Exam   Result Value Ref Range    Bacteria Urine Many (A) None Seen /HPF    RBC Urine 0-2 0-2 /HPF /HPF    WBC Urine 25-50 (A) 0-5 /HPF /HPF    Squamous Epithelials Urine Few (A) None Seen /LPF       ASSESSMENT/PLAN:                                                         ICD-10-CM    1. Acute cystitis without hematuria  N30.00 nitroFURantoin macrocrystal-monohydrate (MACROBID) 100 MG capsule      2. UTI symptoms  R39.9 UA with Microscopic     Urine Culture     Urine Microscopic Exam      3. Dysuria  R30.0 nitroFURantoin macrocrystal-monohydrate (MACROBID) 100 MG capsule     Cranberry 500 MG TABS          Patient Instructions   Thank you for visiting the [facility].    Today we completed testing for urinary tract infection (UTI).  Your initial testing suggests you have this type of infection. A medication was sent to your pharmacy for treatment. You should stay well hydrated and avoid potential bladder irritants.     Please do not hesitate to contact the office if you have any questions or concerns.     Soraya is a 48 yo female with PMHx recurrent UTIs who presents with urinary urgency, frequency, and burning with urination x 1 day.     Dysuria / Urgency / Frequency  - UA indicative of UTI, urine culture pending  - sent empiric Macrobid, will adjust pending culture results  - sent Rx for post-coital Macrobid and cranberry supplement    Ligia Foster PA-C  [facility] [place]

## 2025-01-21 NOTE — PATIENT INSTRUCTIONS
Thank you for visiting the [facility].    Today we completed testing for urinary tract infection (UTI).  Your initial testing suggests you have this type of infection. A medication was sent to your pharmacy for treatment. You should stay well hydrated and avoid potential bladder irritants.     Please do not hesitate to contact the office if you have any questions or concerns.

## 2025-01-23 ENCOUNTER — TELEPHONE (OUTPATIENT)
Dept: OBGYN | Facility: CLINIC | Age: 50
End: 2025-01-23
Payer: COMMERCIAL

## 2025-01-23 LAB — BACTERIA UR CULT: ABNORMAL

## 2025-01-23 RX ORDER — SULFAMETHOXAZOLE AND TRIMETHOPRIM 800; 160 MG/1; MG/1
1 TABLET ORAL 2 TIMES DAILY
Qty: 6 TABLET | Refills: 0 | Status: SHIPPED | OUTPATIENT
Start: 2025-01-23 | End: 2025-01-26

## 2025-01-23 NOTE — TELEPHONE ENCOUNTER
Spoke with Thanh-informed needs to switch from Macrobid to Bactrim. Verified with pharmacy can  PRN Macrobid on the 25th.  Kait Gonzalez RN on 1/23/2025 at 3:24 PM

## 2025-05-11 ENCOUNTER — HEALTH MAINTENANCE LETTER (OUTPATIENT)
Age: 50
End: 2025-05-11

## 2025-07-13 ENCOUNTER — HEALTH MAINTENANCE LETTER (OUTPATIENT)
Age: 50
End: 2025-07-13